# Patient Record
Sex: FEMALE | Race: WHITE
[De-identification: names, ages, dates, MRNs, and addresses within clinical notes are randomized per-mention and may not be internally consistent; named-entity substitution may affect disease eponyms.]

---

## 2017-12-29 ENCOUNTER — HOSPITAL ENCOUNTER (EMERGENCY)
Dept: HOSPITAL 8 - ED | Age: 27
Discharge: HOME | End: 2017-12-29
Payer: COMMERCIAL

## 2017-12-29 VITALS — SYSTOLIC BLOOD PRESSURE: 138 MMHG | DIASTOLIC BLOOD PRESSURE: 84 MMHG

## 2017-12-29 VITALS — BODY MASS INDEX: 21.02 KG/M2 | HEIGHT: 68 IN | WEIGHT: 138.67 LBS

## 2017-12-29 DIAGNOSIS — N83.202: Primary | ICD-10-CM

## 2017-12-29 LAB
ALBUMIN SERPL-MCNC: 4.5 G/DL (ref 3.4–5)
ALP SERPL-CCNC: 45 U/L (ref 45–117)
ALT SERPL-CCNC: 25 U/L (ref 12–78)
ANION GAP SERPL CALC-SCNC: 6 MMOL/L (ref 5–15)
BASOPHILS # BLD AUTO: 0.03 X10^3/UL (ref 0–0.1)
BASOPHILS NFR BLD AUTO: 1 % (ref 0–1)
BILIRUB SERPL-MCNC: 1.5 MG/DL (ref 0.2–1)
CALCIUM SERPL-MCNC: 9 MG/DL (ref 8.5–10.1)
CHLORIDE SERPL-SCNC: 103 MMOL/L (ref 98–107)
CREAT SERPL-MCNC: 0.78 MG/DL (ref 0.55–1.02)
CULTURE INDICATED?: YES
EOSINOPHIL # BLD AUTO: 0.06 X10^3/UL (ref 0–0.4)
EOSINOPHIL NFR BLD AUTO: 1 % (ref 1–7)
ERYTHROCYTE [DISTWIDTH] IN BLOOD BY AUTOMATED COUNT: 12.4 % (ref 9.6–15.2)
LYMPHOCYTES # BLD AUTO: 2.11 X10^3/UL (ref 1–3.4)
LYMPHOCYTES NFR BLD AUTO: 30 % (ref 22–44)
MCH RBC QN AUTO: 32.3 PG (ref 27–34.8)
MCHC RBC AUTO-ENTMCNC: 33.8 G/DL (ref 32.4–35.8)
MCV RBC AUTO: 95.5 FL (ref 80–100)
MD: NO
MICROSCOPIC: (no result)
MONOCYTES # BLD AUTO: 0.5 X10^3/UL (ref 0.2–0.8)
MONOCYTES NFR BLD AUTO: 7 % (ref 2–9)
NEUTROPHILS # BLD AUTO: 4.27 X10^3/UL (ref 1.8–6.8)
NEUTROPHILS NFR BLD AUTO: 61 % (ref 42–75)
PLATELET # BLD AUTO: 287 X10^3/UL (ref 130–400)
PMV BLD AUTO: 8.9 FL (ref 7.4–10.4)
PROT SERPL-MCNC: 8.8 G/DL (ref 6.4–8.2)
RBC # BLD AUTO: 5.21 X10^6/UL (ref 3.82–5.3)

## 2017-12-29 PROCEDURE — 84703 CHORIONIC GONADOTROPIN ASSAY: CPT

## 2017-12-29 PROCEDURE — 99285 EMERGENCY DEPT VISIT HI MDM: CPT

## 2017-12-29 PROCEDURE — 85025 COMPLETE CBC W/AUTO DIFF WBC: CPT

## 2017-12-29 PROCEDURE — 36415 COLL VENOUS BLD VENIPUNCTURE: CPT

## 2017-12-29 PROCEDURE — 87086 URINE CULTURE/COLONY COUNT: CPT

## 2017-12-29 PROCEDURE — 76830 TRANSVAGINAL US NON-OB: CPT

## 2017-12-29 PROCEDURE — 80053 COMPREHEN METABOLIC PANEL: CPT

## 2017-12-29 PROCEDURE — 81001 URINALYSIS AUTO W/SCOPE: CPT

## 2020-07-10 ENCOUNTER — HOSPITAL ENCOUNTER (OUTPATIENT)
Facility: MEDICAL CENTER | Age: 30
End: 2020-07-10
Attending: OBSTETRICS & GYNECOLOGY
Payer: OTHER GOVERNMENT

## 2020-07-10 PROCEDURE — 87591 N.GONORRHOEAE DNA AMP PROB: CPT

## 2020-07-10 PROCEDURE — 88175 CYTOPATH C/V AUTO FLUID REDO: CPT

## 2020-07-10 PROCEDURE — 87491 CHLMYD TRACH DNA AMP PROBE: CPT

## 2020-07-14 LAB
C TRACH DNA GENITAL QL NAA+PROBE: NEGATIVE
CYTOLOGY REG CYTOL: NORMAL
N GONORRHOEA DNA GENITAL QL NAA+PROBE: NEGATIVE
SPECIMEN SOURCE: NORMAL

## 2020-08-21 ENCOUNTER — HOSPITAL ENCOUNTER (OUTPATIENT)
Dept: LAB | Facility: MEDICAL CENTER | Age: 30
End: 2020-08-21
Attending: OBSTETRICS & GYNECOLOGY
Payer: OTHER GOVERNMENT

## 2020-08-21 LAB
ABO GROUP BLD: NORMAL
BASOPHILS # BLD AUTO: 0.3 % (ref 0–1.8)
BASOPHILS # BLD: 0.04 K/UL (ref 0–0.12)
BLD GP AB SCN SERPL QL: NORMAL
EOSINOPHIL # BLD AUTO: 0.06 K/UL (ref 0–0.51)
EOSINOPHIL NFR BLD: 0.5 % (ref 0–6.9)
ERYTHROCYTE [DISTWIDTH] IN BLOOD BY AUTOMATED COUNT: 40.6 FL (ref 35.9–50)
HBV SURFACE AG SER QL: NORMAL
HCT VFR BLD AUTO: 45 % (ref 37–47)
HCV AB SER QL: NORMAL
HGB BLD-MCNC: 15.5 G/DL (ref 12–16)
HIV 1+2 AB+HIV1 P24 AG SERPL QL IA: NORMAL
IMM GRANULOCYTES # BLD AUTO: 0.05 K/UL (ref 0–0.11)
IMM GRANULOCYTES NFR BLD AUTO: 0.4 % (ref 0–0.9)
LYMPHOCYTES # BLD AUTO: 1.99 K/UL (ref 1–4.8)
LYMPHOCYTES NFR BLD: 16.2 % (ref 22–41)
MCH RBC QN AUTO: 32.8 PG (ref 27–33)
MCHC RBC AUTO-ENTMCNC: 34.4 G/DL (ref 33.6–35)
MCV RBC AUTO: 95.1 FL (ref 81.4–97.8)
MONOCYTES # BLD AUTO: 0.71 K/UL (ref 0–0.85)
MONOCYTES NFR BLD AUTO: 5.8 % (ref 0–13.4)
NEUTROPHILS # BLD AUTO: 9.45 K/UL (ref 2–7.15)
NEUTROPHILS NFR BLD: 76.8 % (ref 44–72)
NRBC # BLD AUTO: 0 K/UL
NRBC BLD-RTO: 0 /100 WBC
PLATELET # BLD AUTO: 255 K/UL (ref 164–446)
PMV BLD AUTO: 10.8 FL (ref 9–12.9)
RBC # BLD AUTO: 4.73 M/UL (ref 4.2–5.4)
RH BLD: NORMAL
RUBV AB SER QL: 202 IU/ML
TREPONEMA PALLIDUM IGG+IGM AB [PRESENCE] IN SERUM OR PLASMA BY IMMUNOASSAY: NORMAL
WBC # BLD AUTO: 12.3 K/UL (ref 4.8–10.8)

## 2020-08-21 PROCEDURE — 36415 COLL VENOUS BLD VENIPUNCTURE: CPT

## 2020-08-21 PROCEDURE — 87340 HEPATITIS B SURFACE AG IA: CPT

## 2020-08-21 PROCEDURE — 86762 RUBELLA ANTIBODY: CPT

## 2020-08-21 PROCEDURE — 86780 TREPONEMA PALLIDUM: CPT

## 2020-08-21 PROCEDURE — 86850 RBC ANTIBODY SCREEN: CPT

## 2020-08-21 PROCEDURE — 86901 BLOOD TYPING SEROLOGIC RH(D): CPT

## 2020-08-21 PROCEDURE — 86900 BLOOD TYPING SEROLOGIC ABO: CPT

## 2020-08-21 PROCEDURE — 85025 COMPLETE CBC W/AUTO DIFF WBC: CPT

## 2020-08-21 PROCEDURE — 86803 HEPATITIS C AB TEST: CPT

## 2020-08-21 PROCEDURE — 87389 HIV-1 AG W/HIV-1&-2 AB AG IA: CPT

## 2020-09-18 ENCOUNTER — HOSPITAL ENCOUNTER (OUTPATIENT)
Dept: LAB | Facility: MEDICAL CENTER | Age: 30
End: 2020-09-18
Attending: OBSTETRICS & GYNECOLOGY
Payer: OTHER GOVERNMENT

## 2020-09-18 PROCEDURE — 82105 ALPHA-FETOPROTEIN SERUM: CPT

## 2020-09-18 PROCEDURE — 36415 COLL VENOUS BLD VENIPUNCTURE: CPT

## 2020-09-21 LAB
# FETUSES US: NORMAL
AFP MOM SERPL: 0.95
AFP SERPL-MCNC: 35 NG/ML
AGE - REPORTED: 31.6 YR
CURRENT SMOKER: NO
FAMILY MEMBER DISEASES HX: NO
GA METHOD: NORMAL
GA: NORMAL WK
IDDM PATIENT QL: NO
INTEGRATED SCN PATIENT-IMP: NORMAL
SPECIMEN DRAWN SERPL: NORMAL

## 2020-09-28 ENCOUNTER — HOSPITAL ENCOUNTER (OUTPATIENT)
Facility: MEDICAL CENTER | Age: 30
End: 2020-09-28
Attending: OBSTETRICS & GYNECOLOGY | Admitting: OBSTETRICS & GYNECOLOGY
Payer: OTHER GOVERNMENT

## 2020-09-28 VITALS
WEIGHT: 145 LBS | BODY MASS INDEX: 21.98 KG/M2 | HEART RATE: 79 BPM | SYSTOLIC BLOOD PRESSURE: 133 MMHG | HEIGHT: 68 IN | DIASTOLIC BLOOD PRESSURE: 70 MMHG | RESPIRATION RATE: 20 BRPM | OXYGEN SATURATION: 98 % | TEMPERATURE: 98.2 F

## 2020-09-29 NOTE — PROGRESS NOTES
"1900- EDC 2021, . Pt presented to labor unit for vaginal discharge. Denies UC's, LOF, VB. Abd soft and non tender touch. Doppler done, 's. 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 \" States she's not felt the baby move at this point. Dr. Goodwin at bedside. Performed SVE- closed/ thick/ long. US done at bedside as well,  confirmed cervical length and assessed well being of fetus. Orders received to discharge pt home undelivered.     - Discharge instructions given and explained, Pt verbalized understanding.     - Pt dicharged home undelivered.      "

## 2020-09-29 NOTE — CONSULTS
"DATE OF SERVICE:  2020    OB CONSULT    CHIEF COMPLAINT:  Concern about possible cervical dilatation.    HISTORY OF PRESENT ILLNESS:  The patient is a 30-year-old lady,  1 with   an ROBIN of 2021, making her EGA 18 weeks.  She reports uncomplicated   prenatal care until today when she passed cervical mucus at both 1300 and   1700.  There was no blood in the mucus.  She denies any history of   cervical pathology, and in particular has never had any surgical procedures on   her cervix.  She states that she \"checked her cervix\" herself and was concerned   that she might be dilated, so I asked her to come to labor and delivery.    PHYSICAL EXAMINATION:  Vitals are normal.  Fetal heart rate is normal.   Digital cervical exam is   nonworrisome, cervix is firm and tightly closed at the external os.     TRANSVAGINAL ULTRASOUND by me reveals normal cervical anatomy, with a cervical length of 42 mm and   no beaking present. Limited transabdominal  ultrasound reveals active fetus with normal amniotic fluid volume,   anterior placenta with no placenta previa.     I reassured the patient.  There are no activity restrictions.  She will go   home and keep her next appointment with Dr. Cosme.       ____________________________________     FLACO DIAZ MD    BEF / NTS    DD:  2020 19:40:46  DT:  2020 23:43:37    D#:  [id number]  Job#:  [id number]    cc: VIRAJ COSME MD    "

## 2020-11-12 ENCOUNTER — HOSPITAL ENCOUNTER (OUTPATIENT)
Dept: LAB | Facility: MEDICAL CENTER | Age: 30
End: 2020-11-12
Attending: OBSTETRICS & GYNECOLOGY
Payer: OTHER GOVERNMENT

## 2020-11-12 LAB
GLUCOSE 1H P 50 G GLC PO SERPL-MCNC: 86 MG/DL (ref 70–139)
HCT VFR BLD AUTO: 43.4 % (ref 37–47)
HGB BLD-MCNC: 14.3 G/DL (ref 12–16)
PLATELET # BLD AUTO: 205 K/UL (ref 164–446)
TREPONEMA PALLIDUM IGG+IGM AB [PRESENCE] IN SERUM OR PLASMA BY IMMUNOASSAY: NORMAL

## 2020-11-12 PROCEDURE — 86780 TREPONEMA PALLIDUM: CPT

## 2020-11-12 PROCEDURE — 85049 AUTOMATED PLATELET COUNT: CPT

## 2020-11-12 PROCEDURE — 82950 GLUCOSE TEST: CPT

## 2020-11-12 PROCEDURE — 85014 HEMATOCRIT: CPT

## 2020-11-12 PROCEDURE — 85018 HEMOGLOBIN: CPT

## 2020-11-12 PROCEDURE — 36415 COLL VENOUS BLD VENIPUNCTURE: CPT

## 2021-01-28 ENCOUNTER — HOSPITAL ENCOUNTER (OUTPATIENT)
Dept: LAB | Facility: MEDICAL CENTER | Age: 31
End: 2021-01-28
Attending: OBSTETRICS & GYNECOLOGY
Payer: OTHER GOVERNMENT

## 2021-01-28 PROCEDURE — 87150 DNA/RNA AMPLIFIED PROBE: CPT

## 2021-01-28 PROCEDURE — 87081 CULTURE SCREEN ONLY: CPT

## 2021-01-29 LAB — GP B STREP DNA SPEC QL NAA+PROBE: POSITIVE

## 2021-03-01 ENCOUNTER — HOSPITAL ENCOUNTER (INPATIENT)
Facility: MEDICAL CENTER | Age: 31
LOS: 2 days | End: 2021-03-03
Attending: OBSTETRICS & GYNECOLOGY | Admitting: OBSTETRICS & GYNECOLOGY
Payer: OTHER GOVERNMENT

## 2021-03-01 LAB
ERYTHROCYTE [DISTWIDTH] IN BLOOD BY AUTOMATED COUNT: 42 FL (ref 35.9–50)
HCT VFR BLD AUTO: 38.9 % (ref 37–47)
HGB BLD-MCNC: 12.7 G/DL (ref 12–16)
MCH RBC QN AUTO: 29.3 PG (ref 27–33)
MCHC RBC AUTO-ENTMCNC: 32.6 G/DL (ref 33.6–35)
MCV RBC AUTO: 89.8 FL (ref 81.4–97.8)
PLATELET # BLD AUTO: 155 K/UL (ref 164–446)
PMV BLD AUTO: 11.7 FL (ref 9–12.9)
RBC # BLD AUTO: 4.33 M/UL (ref 4.2–5.4)
SARS-COV+SARS-COV-2 AG RESP QL IA.RAPID: NOTDETECTED
SARS-COV-2 RNA RESP QL NAA+PROBE: NOTDETECTED
SPECIMEN SOURCE: NORMAL
SPECIMEN SOURCE: NORMAL
WBC # BLD AUTO: 17.3 K/UL (ref 4.8–10.8)

## 2021-03-01 PROCEDURE — 700101 HCHG RX REV CODE 250

## 2021-03-01 PROCEDURE — 36415 COLL VENOUS BLD VENIPUNCTURE: CPT

## 2021-03-01 PROCEDURE — 700111 HCHG RX REV CODE 636 W/ 250 OVERRIDE (IP)

## 2021-03-01 PROCEDURE — 85027 COMPLETE CBC AUTOMATED: CPT

## 2021-03-01 PROCEDURE — 700111 HCHG RX REV CODE 636 W/ 250 OVERRIDE (IP): Performed by: OBSTETRICS & GYNECOLOGY

## 2021-03-01 PROCEDURE — 304965 HCHG RECOVERY SERVICES

## 2021-03-01 PROCEDURE — U0005 INFEC AGEN DETEC AMPLI PROBE: HCPCS

## 2021-03-01 PROCEDURE — 700102 HCHG RX REV CODE 250 W/ 637 OVERRIDE(OP): Performed by: OBSTETRICS & GYNECOLOGY

## 2021-03-01 PROCEDURE — 59409 OBSTETRICAL CARE: CPT

## 2021-03-01 PROCEDURE — 0UBMXZZ EXCISION OF VULVA, EXTERNAL APPROACH: ICD-10-PCS | Performed by: OBSTETRICS & GYNECOLOGY

## 2021-03-01 PROCEDURE — 770002 HCHG ROOM/CARE - OB PRIVATE (112)

## 2021-03-01 PROCEDURE — A9270 NON-COVERED ITEM OR SERVICE: HCPCS | Performed by: OBSTETRICS & GYNECOLOGY

## 2021-03-01 PROCEDURE — 87426 SARSCOV CORONAVIRUS AG IA: CPT

## 2021-03-01 PROCEDURE — U0003 INFECTIOUS AGENT DETECTION BY NUCLEIC ACID (DNA OR RNA); SEVERE ACUTE RESPIRATORY SYNDROME CORONAVIRUS 2 (SARS-COV-2) (CORONAVIRUS DISEASE [COVID-19]), AMPLIFIED PROBE TECHNIQUE, MAKING USE OF HIGH THROUGHPUT TECHNOLOGIES AS DESCRIBED BY CMS-2020-01-R: HCPCS

## 2021-03-01 PROCEDURE — 700105 HCHG RX REV CODE 258: Performed by: OBSTETRICS & GYNECOLOGY

## 2021-03-01 RX ORDER — ONDANSETRON 4 MG/1
4 TABLET, ORALLY DISINTEGRATING ORAL EVERY 6 HOURS PRN
Status: DISCONTINUED | OUTPATIENT
Start: 2021-03-01 | End: 2021-03-03 | Stop reason: HOSPADM

## 2021-03-01 RX ORDER — METHYLERGONOVINE MALEATE 0.2 MG/ML
0.2 INJECTION INTRAVENOUS
Status: DISCONTINUED | OUTPATIENT
Start: 2021-03-01 | End: 2021-03-03 | Stop reason: HOSPADM

## 2021-03-01 RX ORDER — HYDROCODONE BITARTRATE AND ACETAMINOPHEN 5; 325 MG/1; MG/1
1 TABLET ORAL EVERY 4 HOURS PRN
Status: DISCONTINUED | OUTPATIENT
Start: 2021-03-01 | End: 2021-03-03 | Stop reason: HOSPADM

## 2021-03-01 RX ORDER — METHYLERGONOVINE MALEATE 0.2 MG/ML
0.2 INJECTION INTRAVENOUS
Status: DISCONTINUED | OUTPATIENT
Start: 2021-03-01 | End: 2021-03-01 | Stop reason: HOSPADM

## 2021-03-01 RX ORDER — MISOPROSTOL 200 UG/1
800 TABLET ORAL
Status: DISCONTINUED | OUTPATIENT
Start: 2021-03-01 | End: 2021-03-01 | Stop reason: HOSPADM

## 2021-03-01 RX ORDER — MISOPROSTOL 200 UG/1
800 TABLET ORAL
Status: DISCONTINUED | OUTPATIENT
Start: 2021-03-01 | End: 2021-03-03 | Stop reason: HOSPADM

## 2021-03-01 RX ORDER — SODIUM CHLORIDE, SODIUM LACTATE, POTASSIUM CHLORIDE, CALCIUM CHLORIDE 600; 310; 30; 20 MG/100ML; MG/100ML; MG/100ML; MG/100ML
INJECTION, SOLUTION INTRAVENOUS PRN
Status: DISCONTINUED | OUTPATIENT
Start: 2021-03-01 | End: 2021-03-03 | Stop reason: HOSPADM

## 2021-03-01 RX ORDER — HYDROCODONE BITARTRATE AND ACETAMINOPHEN 10; 325 MG/1; MG/1
1 TABLET ORAL EVERY 4 HOURS PRN
Status: DISCONTINUED | OUTPATIENT
Start: 2021-03-01 | End: 2021-03-03 | Stop reason: HOSPADM

## 2021-03-01 RX ORDER — LIDOCAINE HYDROCHLORIDE 10 MG/ML
INJECTION, SOLUTION INFILTRATION; PERINEURAL
Status: COMPLETED
Start: 2021-03-01 | End: 2021-03-01

## 2021-03-01 RX ORDER — ONDANSETRON 2 MG/ML
4 INJECTION INTRAMUSCULAR; INTRAVENOUS EVERY 6 HOURS PRN
Status: DISCONTINUED | OUTPATIENT
Start: 2021-03-01 | End: 2021-03-03 | Stop reason: HOSPADM

## 2021-03-01 RX ORDER — CITRIC ACID/SODIUM CITRATE 334-500MG
30 SOLUTION, ORAL ORAL EVERY 6 HOURS PRN
Status: DISCONTINUED | OUTPATIENT
Start: 2021-03-01 | End: 2021-03-01 | Stop reason: HOSPADM

## 2021-03-01 RX ORDER — SODIUM CHLORIDE, SODIUM LACTATE, POTASSIUM CHLORIDE, CALCIUM CHLORIDE 600; 310; 30; 20 MG/100ML; MG/100ML; MG/100ML; MG/100ML
INJECTION, SOLUTION INTRAVENOUS CONTINUOUS
Status: ACTIVE | OUTPATIENT
Start: 2021-03-01 | End: 2021-03-01

## 2021-03-01 RX ORDER — ACETAMINOPHEN 325 MG/1
325 TABLET ORAL EVERY 4 HOURS PRN
Status: DISCONTINUED | OUTPATIENT
Start: 2021-03-01 | End: 2021-03-03 | Stop reason: HOSPADM

## 2021-03-01 RX ORDER — IBUPROFEN 600 MG/1
600 TABLET ORAL EVERY 6 HOURS PRN
Status: DISCONTINUED | OUTPATIENT
Start: 2021-03-01 | End: 2021-03-03 | Stop reason: HOSPADM

## 2021-03-01 RX ORDER — VITAMIN A ACETATE, BETA CAROTENE, ASCORBIC ACID, CHOLECALCIFEROL, .ALPHA.-TOCOPHEROL ACETATE, DL-, THIAMINE MONONITRATE, RIBOFLAVIN, NIACINAMIDE, PYRIDOXINE HYDROCHLORIDE, FOLIC ACID, CYANOCOBALAMIN, CALCIUM CARBONATE, FERROUS FUMARATE, ZINC OXIDE, CUPRIC OXIDE 3080; 12; 120; 400; 1; 1.84; 3; 20; 22; 920; 25; 200; 27; 10; 2 [IU]/1; UG/1; MG/1; [IU]/1; MG/1; MG/1; MG/1; MG/1; MG/1; [IU]/1; MG/1; MG/1; MG/1; MG/1; MG/1
1 TABLET, FILM COATED ORAL
Status: DISCONTINUED | OUTPATIENT
Start: 2021-03-02 | End: 2021-03-03 | Stop reason: HOSPADM

## 2021-03-01 RX ORDER — LORATADINE 10 MG/1
10 TABLET ORAL DAILY
Status: ON HOLD | COMMUNITY
End: 2021-03-03

## 2021-03-01 RX ORDER — DOCUSATE SODIUM 100 MG/1
100 CAPSULE, LIQUID FILLED ORAL 2 TIMES DAILY PRN
Status: DISCONTINUED | OUTPATIENT
Start: 2021-03-01 | End: 2021-03-03 | Stop reason: HOSPADM

## 2021-03-01 RX ADMIN — SODIUM CHLORIDE, POTASSIUM CHLORIDE, SODIUM LACTATE AND CALCIUM CHLORIDE: 600; 310; 30; 20 INJECTION, SOLUTION INTRAVENOUS at 07:14

## 2021-03-01 RX ADMIN — AMPICILLIN SODIUM 2 G: 2 INJECTION, POWDER, FOR SOLUTION INTRAMUSCULAR; INTRAVENOUS at 07:14

## 2021-03-01 RX ADMIN — OXYTOCIN 20 UNITS: 10 INJECTION, SOLUTION INTRAMUSCULAR; INTRAVENOUS at 07:48

## 2021-03-01 RX ADMIN — LIDOCAINE HYDROCHLORIDE: 10 INJECTION, SOLUTION INFILTRATION; PERINEURAL at 07:55

## 2021-03-01 RX ADMIN — IBUPROFEN 600 MG: 600 TABLET ORAL at 16:28

## 2021-03-01 RX ADMIN — ACETAMINOPHEN 325 MG: 325 TABLET, FILM COATED ORAL at 09:06

## 2021-03-01 RX ADMIN — ACETAMINOPHEN 325 MG: 325 TABLET, FILM COATED ORAL at 16:25

## 2021-03-01 RX ADMIN — IBUPROFEN 600 MG: 600 TABLET ORAL at 09:06

## 2021-03-01 ASSESSMENT — PATIENT HEALTH QUESTIONNAIRE - PHQ9
1. LITTLE INTEREST OR PLEASURE IN DOING THINGS: NOT AT ALL
SUM OF ALL RESPONSES TO PHQ9 QUESTIONS 1 AND 2: 0
2. FEELING DOWN, DEPRESSED, IRRITABLE, OR HOPELESS: NOT AT ALL

## 2021-03-01 ASSESSMENT — COPD QUESTIONNAIRES
DO YOU EVER COUGH UP ANY MUCUS OR PHLEGM?: NO/ONLY WITH OCCASIONAL COLDS OR INFECTIONS
HAVE YOU SMOKED AT LEAST 100 CIGARETTES IN YOUR ENTIRE LIFE: NO/DON'T KNOW
COPD SCREENING SCORE: 0
DURING THE PAST 4 WEEKS HOW MUCH DID YOU FEEL SHORT OF BREATH: NONE/LITTLE OF THE TIME
IN THE PAST 12 MONTHS DO YOU DO LESS THAN YOU USED TO BECAUSE OF YOUR BREATHING PROBLEMS: DISAGREE/UNSURE

## 2021-03-01 ASSESSMENT — LIFESTYLE VARIABLES
EVER_SMOKED: NEVER
AVERAGE NUMBER OF DAYS PER WEEK YOU HAVE A DRINK CONTAINING ALCOHOL: 0
CONSUMPTION TOTAL: NEGATIVE
EVER FELT BAD OR GUILTY ABOUT YOUR DRINKING: NO
ON A TYPICAL DAY WHEN YOU DRINK ALCOHOL HOW MANY DRINKS DO YOU HAVE: 0
TOTAL SCORE: 0
TOTAL SCORE: 0
EVER HAD A DRINK FIRST THING IN THE MORNING TO STEADY YOUR NERVES TO GET RID OF A HANGOVER: NO
HOW MANY TIMES IN THE PAST YEAR HAVE YOU HAD 5 OR MORE DRINKS IN A DAY: 0
HAVE YOU EVER FELT YOU SHOULD CUT DOWN ON YOUR DRINKING: NO
TOTAL SCORE: 0
ALCOHOL_USE: NO
HAVE PEOPLE ANNOYED YOU BY CRITICIZING YOUR DRINKING: NO

## 2021-03-01 ASSESSMENT — PAIN DESCRIPTION - PAIN TYPE
TYPE: ACUTE PAIN
TYPE: ACUTE PAIN

## 2021-03-01 NOTE — PROGRESS NOTES
Report received from Isabel KRAUSE. 8-9 cm; intact. Transferred to [facility] via rney. Amp given for GBS positive. Dr. rice remain at bedside  0733: Pt wants to push; C/+2. MD remains at bedside for  of viable male at 0746. AROM with delivery of head to reduce nuchal x1. Baby and mother stable for skin to skin  1015: Bleeding light; fundus firm. Assisted to bathroom to void; able to void sufficient amount.

## 2021-03-01 NOTE — PROGRESS NOTES
Assumed pt care at 0715.  Received report from alexis RN.  Assessment completed.  Pt AAOx4.  Pt has no comlaints of pain at this time. Treaded socks in place, call light within reach and staff numbers provided.  Pt needs met at this time.

## 2021-03-01 NOTE — L&D DELIVERY NOTE
and elective excision of 3 vulvar skin tags (complete note dictated)    Baby: male, APGARs 8-8, not weighed yet  One loose NC  plac spont,  cc  Pt requested excision of 3 small vulvar skin tags, betadine prep, skin tags discarded and all 3 wounds approx with single sutures 3-0 monocryl    Only time for one dose ampicillin  SROM at delivery, clear AF

## 2021-03-01 NOTE — PROGRESS NOTES
0641-Pt presents to L&D c/o UC's every couple of minutes that have become more intense over the night. Denies LOF or VB and confirms +FM. TOCO and EFM applied. VS taken.  0643-SVE as charted  0648-Phoned Dr. Goodwin, updated on pt arrival/complaint/status, admit orders received  0650-Report given to PAUL Zamudio RN. POC discussed.

## 2021-03-01 NOTE — L&D DELIVERY NOTE
DATE OF SERVICE:  2021     PROCEDURES:  1.  Spontaneous vaginal delivery.  2.  Elective excision of three small vulvar skin tags.     OBSTETRICIAN:  Abhay Goodwin MD     DIAGNOSES:   1.  A 40-1/7 week gestation.  2.  Labor.  3.  Group B streptococcus positive.  4.  Nuchal cord  5.  Three vulvar skin tags     COMPLICATIONS:  None.     ESTIMATED BLOOD LOSS:  300 mL     FINDINGS:  Baby--- male.  One minute Apgar 8, five minute Apgar 8.  Weight:  3230 grams     SPECIMEN SENT TO PATHOLOGY:  None (skin tags discarded).     This 30-year-old lady is now .  She presented one day after her due date with   spontaneous labor and intact membranes.  She was 9 cm dilated on admission.    She received one dose of IV ampicillin because of a positive antepartum group   B strep culture.  There was not time for a second dose.  Second stage was   short.  Membranes ruptured during the delivery, revealing clear fluid.  Baby's   head came out occiput anterior in a controlled fashion with no episiotomy.  I   bulb suctioned the baby's mouth and nares.  I reduced a single loose nuchal   cord.  The shoulders and body delivered easily.  I placed the baby on her   chest and 5 minutes later, I doubly clamped and cut the cord.  The placenta   and all attached membranes delivered spontaneously in a timely fashion.  There   were no significant lacerations.  A hymenal remnant at the 7 o'clock position   split, but was not bleeding significantly and did not require sutures.     After the delivery, the patient requested that I excised three small vulvar   skin tags.  Two of them were on her right, one on her left, on the labia   majora.  I prepped the wounds with Betadine, excised the skin tags with   scissors, discarded the benign skin tags and closed each small wound with a   single suture of 3-0 Monocryl. Sponge and needle counts were correct.        ______________________________  MD ROMINA PARK/SUMEET    DD:   03/01/2021 08:13  DT:  03/01/2021 08:37    Job#:  [id number]    CC:VIRAJ COSME MD

## 2021-03-01 NOTE — H&P
ADMISSION HISTORY AND PHYSICAL EXAM, LABOR AND DELIVERY    30 year old G 1  P 0    ROBIN 2/28/2021   EGA 40 1/7 wks    CC/HPI: Contractions 14 hrs, much stronger for 2 hrs, denies fluid leakage, 9 cm dilated per RN.    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 HTN; F depression    PE:   Temp 97.9F  /78  HEENT WNL  Heart normal  Lungs CTA  Abdomen soft, NT, no HSM, fundal height appropriate  Extremities:  no edema,  DTR 1+, Tran neg bilaterally  Pelvic:  cervix 8-9 cm/100%/-1 per RN , cephalic presentation    LAB:    pending    MONITOR FINDINGS: FHR Cat II      Dx:    1. 40 1/7 wk gestation  2. GBS positive    PLAN:  Admit.  IV ampicillin GBS prophylaxis.  Pursue delivery.

## 2021-03-02 PROCEDURE — A9270 NON-COVERED ITEM OR SERVICE: HCPCS | Performed by: OBSTETRICS & GYNECOLOGY

## 2021-03-02 PROCEDURE — 770002 HCHG ROOM/CARE - OB PRIVATE (112)

## 2021-03-02 PROCEDURE — 700102 HCHG RX REV CODE 250 W/ 637 OVERRIDE(OP): Performed by: OBSTETRICS & GYNECOLOGY

## 2021-03-02 RX ADMIN — IBUPROFEN 600 MG: 600 TABLET ORAL at 02:29

## 2021-03-02 RX ADMIN — ACETAMINOPHEN 325 MG: 325 TABLET, FILM COATED ORAL at 02:29

## 2021-03-02 RX ADMIN — IBUPROFEN 600 MG: 600 TABLET ORAL at 17:27

## 2021-03-02 RX ADMIN — IBUPROFEN 600 MG: 600 TABLET ORAL at 21:29

## 2021-03-02 RX ADMIN — ACETAMINOPHEN 325 MG: 325 TABLET, FILM COATED ORAL at 17:28

## 2021-03-02 RX ADMIN — ACETAMINOPHEN 325 MG: 325 TABLET, FILM COATED ORAL at 23:34

## 2021-03-02 RX ADMIN — PRENATAL WITH FERROUS FUM AND FOLIC ACID 1 TABLET: 3080; 920; 120; 400; 22; 1.84; 3; 20; 10; 1; 12; 200; 27; 25; 2 TABLET ORAL at 08:21

## 2021-03-02 ASSESSMENT — PAIN DESCRIPTION - PAIN TYPE
TYPE: ACUTE PAIN
TYPE: ACUTE PAIN

## 2021-03-02 NOTE — CARE PLAN
Problem: Potential knowledge deficit related to lack of understanding of self and  care  Goal: Patient will verbalize understanding of self and infant care  Outcome: PROGRESSING AS EXPECTED     Problem: Potential anxiety related to difficulty adapting to parental role  Goal: Patient will verbalize and demonstrate effective bonding and parenting behavior  Outcome: PROGRESSING AS EXPECTED

## 2021-03-02 NOTE — LACTATION NOTE
This note was copied from a baby's chart.  Baby 40.1 weeks, , MOB Hx +breast changes, denies breastfeeding risk factors. RN called LC to room for latch assist. MOB has baby at breast, attempting to latch, baby swaddled in 2 blankets. LC un-swaddled baby and placed STS using starter position on right breast baby latched deep x 7 minutes then MOB moved baby to left breast & latched with positioning assistance- see latch assessment score.     Hand expression taught, MOB watched [employer] hand expression video & LC provided demo. Encouraged mother to hand express in addition to breastfeeding 4-5 times during day until milk supply comes in. Teaching on hunger cues, breastfeeding when baby shows cues no longer than 4 hours from last feed, breastfeed a minimum 8 times in 24 hours & cluster feeding is normal.     Breastfeeding Support & Zoom sheet provided.     Breastfeeding plan:  Breastfeed on cue a minimum 8x/24 hours no longer than 4 hours from last feed.

## 2021-03-02 NOTE — PROGRESS NOTES
PP day #1    S:  Pt doing well/  Minimal lochia. No problems voiding.  Working on breast feeding.  Will stay until tomorrow     O: T 97.8 P 63  /71  ABD: Soft, NT, FF below umb  EXT: no cords or Homans  H/H pending - was  on admission  GBS +  A+    A/P:  PP day #1 S/P , GBS +  Doing well  1.  F/U on H/H  2.  Continue routine pp care

## 2021-03-02 NOTE — PROGRESS NOTES
1900-report received from day rn. Pt in bed, awake at this time. Denies any needs for pain medication at this time. POC discussed. Verablizes understanding. Baby currently in nursery per mother request to allow rest. Next feeding due by 2130. Call light in reach. Bed locked and in low position. No needs at this time.

## 2021-03-03 VITALS
BODY MASS INDEX: 24.73 KG/M2 | HEART RATE: 69 BPM | SYSTOLIC BLOOD PRESSURE: 118 MMHG | RESPIRATION RATE: 18 BRPM | DIASTOLIC BLOOD PRESSURE: 70 MMHG | WEIGHT: 167 LBS | OXYGEN SATURATION: 98 % | TEMPERATURE: 98 F | HEIGHT: 69 IN

## 2021-03-03 PROCEDURE — 700102 HCHG RX REV CODE 250 W/ 637 OVERRIDE(OP): Performed by: OBSTETRICS & GYNECOLOGY

## 2021-03-03 PROCEDURE — A9270 NON-COVERED ITEM OR SERVICE: HCPCS | Performed by: OBSTETRICS & GYNECOLOGY

## 2021-03-03 RX ADMIN — IBUPROFEN 600 MG: 600 TABLET ORAL at 09:30

## 2021-03-03 RX ADMIN — ACETAMINOPHEN 325 MG: 325 TABLET, FILM COATED ORAL at 03:32

## 2021-03-03 RX ADMIN — PRENATAL WITH FERROUS FUM AND FOLIC ACID 1 TABLET: 3080; 920; 120; 400; 22; 1.84; 3; 20; 10; 1; 12; 200; 27; 25; 2 TABLET ORAL at 09:30

## 2021-03-03 RX ADMIN — IBUPROFEN 600 MG: 600 TABLET ORAL at 03:31

## 2021-03-03 RX ADMIN — ACETAMINOPHEN 325 MG: 325 TABLET, FILM COATED ORAL at 09:30

## 2021-03-03 ASSESSMENT — EDINBURGH POSTNATAL DEPRESSION SCALE (EPDS)
I HAVE BLAMED MYSELF UNNECESSARILY WHEN THINGS WENT WRONG: NO, NEVER
THINGS HAVE BEEN GETTING ON TOP OF ME: NO, I HAVE BEEN COPING AS WELL AS EVER
I HAVE BEEN ANXIOUS OR WORRIED FOR NO GOOD REASON: YES, VERY OFTEN
I HAVE BEEN SO UNHAPPY THAT I HAVE HAD DIFFICULTY SLEEPING: NOT AT ALL
I HAVE BEEN ABLE TO LAUGH AND SEE THE FUNNY SIDE OF THINGS: AS MUCH AS I ALWAYS COULD
I HAVE BEEN SO UNHAPPY THAT I HAVE BEEN CRYING: NO, NEVER
I HAVE FELT SCARED OR PANICKY FOR NO GOOD REASON: NO, NOT MUCH
I HAVE FELT SAD OR MISERABLE: NO, NOT AT ALL
I HAVE LOOKED FORWARD WITH ENJOYMENT TO THINGS: AS MUCH AS I EVER DID
THE THOUGHT OF HARMING MYSELF HAS OCCURRED TO ME: HARDLY EVER

## 2021-03-03 ASSESSMENT — PAIN DESCRIPTION - PAIN TYPE: TYPE: ACUTE PAIN

## 2021-03-03 NOTE — CARE PLAN
Patient has scant to light lochia with a firm palpable uterus.  Vital signs are within defined limits.  Assessment will continue.     Patient would like pain medication when it is available.  Pain assessment will continue.

## 2021-03-03 NOTE — PROGRESS NOTES
Mom and baby bonding well, pain controlled with motrin and tylenol.  Updated with plan of care, call light in reach and encourage to call for assistance.

## 2021-03-03 NOTE — DISCHARGE SUMMARY
Discharge Summary:      Helen Russo    Admit Date:   3/1/2021  Discharge Date:  3/3/2021     Admitting diagnosis:  Labor and delivery indication for care or intervention [O75.9]  Discharge Diagnosis: Status post vaginal, spontaneous.  Pregnancy Complications: GBBS carrier  Tubal Ligation:  no        History:  No past medical history on file.  OB History    Para Term  AB Living   1 1 1     1   SAB TAB Ectopic Molar Multiple Live Births           0 1      # Outcome Date GA Lbr Mejia/2nd Weight Sex Delivery Anes PTL Lv   1 Term 21 40w1d / 00:13 3.23 kg (7 lb 1.9 oz) M Vag-Spont Local N RACHEL        Patient has no known allergies.  There are no problems to display for this patient.       Hospital Course:   30 y.o. , now para 1, was admitted with the above mentioned diagnosis, pt admitted in Active Labor, she progressed to complete and had a vaginal, spontaneous delivery. Patient postpartum course was unremarkable, with progressive advancement in diet , ambulation and toleration of oral analgesia. Patient without complaints today and desires discharge.      Vitals:    21 1807 21 0600 21 1800 21 0547   BP: 119/72 111/71 121/82 118/70   Pulse: 72 63 68 69   Resp: 17 17 18 18   Temp:  36.6 °C (97.8 °F) 37.1 °C (98.8 °F) 36.7 °C (98 °F)   TempSrc: Temporal Temporal Temporal Temporal   SpO2: 97% 98% 96% 98%   Weight:       Height:           Current Facility-Administered Medications   Medication Dose   • ondansetron (ZOFRAN ODT) dispertab 4 mg  4 mg    Or   • ondansetron (ZOFRAN) syringe/vial injection 4 mg  4 mg   • oxytocin (PITOCIN) infusion (for postpartum)   mL/hr   • ibuprofen (MOTRIN) tablet 600 mg  600 mg   • acetaminophen (Tylenol) tablet 325 mg  325 mg   • HYDROcodone-acetaminophen (NORCO) 5-325 MG per tablet 1 tablet  1 tablet   • HYDROcodone/acetaminophen (NORCO)  MG per tablet 1 tablet  1 tablet   • LR infusion     • tetanus-dipth-acell  pertussis (Tdap) inj 0.5 mL  0.5 mL   • PRN oxytocin (PITOCIN) (20 Units/1000 mL) PRN for excessive uterine bleeding - See Admin Instr  125-999 mL/hr   • miSOPROStol (CYTOTEC) tablet 800 mcg  800 mcg   • methylergonovine (METHERGINE) injection 0.2 mg  0.2 mg   • docusate sodium (COLACE) capsule 100 mg  100 mg   • prenatal plus vitamin (STUARTNATAL 1+1) 27-1 MG tablet 1 tablet  1 tablet       Exam:  Gen: NAD  Breast Exam: negative  Abdomen: Abdomen soft, non-tender. BS normal. No masses,  No organomegaly  Fundus Non Tender: no  Extremity: extremities, peripheral pulses and reflexes normal     Labs:  Recent Labs     03/01/21  1859   WBC 17.3*   RBC 4.33   HEMOGLOBIN 12.7   HEMATOCRIT 38.9   MCV 89.8   MCH 29.3   MCHC 32.6*   RDW 42.0   PLATELETCT 155*   MPV 11.7        Activity:   Discharge to home  Pelvic Rest x 6 weeks    Assessment:  normal postpartum course  Discharge Assessment: No areas of skin breakdown/redness.     Follow up: 6 weeks with Dr Davies    Discharge Meds:   No current outpatient medications on file.     Ibuprofen/tylenol, OTC.     Janet Cardenas M.D.

## 2021-03-03 NOTE — LACTATION NOTE
This note was copied from a baby's chart.  Follow-up visit, weight loss 6%, couplet to be discharged today. MOB has baby latched independently, LC observed good positioning & deep latch, MOB denies pain with latch- see latch assessment score.     Breastfeeding teaching reviewed on breastfeed on cue, breastfeed a minimum 8 times in 24 hours no longer than 4 hours from last feed, STS & cluster feeding.    Breastfeeding plan:  Breastfeed on cue a minimum 8x/24 hours no longer than 4 hours from last feed.

## 2021-04-14 ENCOUNTER — HOSPITAL ENCOUNTER (OUTPATIENT)
Dept: LAB | Facility: MEDICAL CENTER | Age: 31
End: 2021-04-14
Attending: OBSTETRICS & GYNECOLOGY
Payer: OTHER GOVERNMENT

## 2021-04-14 LAB — CYTOLOGY REG CYTOL: NORMAL

## 2021-04-14 PROCEDURE — 88175 CYTOPATH C/V AUTO FLUID REDO: CPT

## 2021-11-24 ENCOUNTER — HOSPITAL ENCOUNTER (OUTPATIENT)
Facility: MEDICAL CENTER | Age: 31
End: 2021-11-24
Attending: OBSTETRICS & GYNECOLOGY
Payer: OTHER GOVERNMENT

## 2021-11-24 PROCEDURE — 87491 CHLMYD TRACH DNA AMP PROBE: CPT

## 2021-11-24 PROCEDURE — 87591 N.GONORRHOEAE DNA AMP PROB: CPT

## 2021-11-24 PROCEDURE — 88175 CYTOPATH C/V AUTO FLUID REDO: CPT

## 2021-12-03 ENCOUNTER — HOSPITAL ENCOUNTER (OUTPATIENT)
Dept: LAB | Facility: MEDICAL CENTER | Age: 31
End: 2021-12-03
Attending: OBSTETRICS & GYNECOLOGY

## 2021-12-03 ENCOUNTER — HOSPITAL ENCOUNTER (OUTPATIENT)
Facility: MEDICAL CENTER | Age: 31
End: 2021-12-03
Attending: OBSTETRICS & GYNECOLOGY
Payer: OTHER GOVERNMENT

## 2021-12-03 LAB
ABO GROUP BLD: NORMAL
BASOPHILS # BLD AUTO: 0.4 % (ref 0–1.8)
BASOPHILS # BLD: 0.04 K/UL (ref 0–0.12)
BLD GP AB SCN SERPL QL: NORMAL
EOSINOPHIL # BLD AUTO: 0.1 K/UL (ref 0–0.51)
EOSINOPHIL NFR BLD: 0.9 % (ref 0–6.9)
ERYTHROCYTE [DISTWIDTH] IN BLOOD BY AUTOMATED COUNT: 43 FL (ref 35.9–50)
HBV SURFACE AG SER QL: NORMAL
HCT VFR BLD AUTO: 47.3 % (ref 37–47)
HCV AB SER QL: NORMAL
HGB BLD-MCNC: 15.9 G/DL (ref 12–16)
HIV 1+2 AB+HIV1 P24 AG SERPL QL IA: NORMAL
IMM GRANULOCYTES # BLD AUTO: 0.04 K/UL (ref 0–0.11)
IMM GRANULOCYTES NFR BLD AUTO: 0.4 % (ref 0–0.9)
LYMPHOCYTES # BLD AUTO: 2.1 K/UL (ref 1–4.8)
LYMPHOCYTES NFR BLD: 19.6 % (ref 22–41)
MCH RBC QN AUTO: 31.5 PG (ref 27–33)
MCHC RBC AUTO-ENTMCNC: 33.6 G/DL (ref 33.6–35)
MCV RBC AUTO: 93.7 FL (ref 81.4–97.8)
MONOCYTES # BLD AUTO: 0.54 K/UL (ref 0–0.85)
MONOCYTES NFR BLD AUTO: 5 % (ref 0–13.4)
NEUTROPHILS # BLD AUTO: 7.89 K/UL (ref 2–7.15)
NEUTROPHILS NFR BLD: 73.7 % (ref 44–72)
NRBC # BLD AUTO: 0 K/UL
NRBC BLD-RTO: 0 /100 WBC
PLATELET # BLD AUTO: 229 K/UL (ref 164–446)
PMV BLD AUTO: 10.9 FL (ref 9–12.9)
RBC # BLD AUTO: 5.05 M/UL (ref 4.2–5.4)
RH BLD: NORMAL
RUBV AB SER QL: 246 IU/ML
TREPONEMA PALLIDUM IGG+IGM AB [PRESENCE] IN SERUM OR PLASMA BY IMMUNOASSAY: NORMAL
WBC # BLD AUTO: 10.7 K/UL (ref 4.8–10.8)

## 2021-12-03 PROCEDURE — 87340 HEPATITIS B SURFACE AG IA: CPT

## 2021-12-03 PROCEDURE — 86900 BLOOD TYPING SEROLOGIC ABO: CPT

## 2021-12-03 PROCEDURE — 36415 COLL VENOUS BLD VENIPUNCTURE: CPT

## 2021-12-03 PROCEDURE — 86850 RBC ANTIBODY SCREEN: CPT

## 2021-12-03 PROCEDURE — 86803 HEPATITIS C AB TEST: CPT

## 2021-12-03 PROCEDURE — 87086 URINE CULTURE/COLONY COUNT: CPT

## 2021-12-03 PROCEDURE — 87389 HIV-1 AG W/HIV-1&-2 AB AG IA: CPT

## 2021-12-03 PROCEDURE — 85025 COMPLETE CBC W/AUTO DIFF WBC: CPT

## 2021-12-03 PROCEDURE — 86901 BLOOD TYPING SEROLOGIC RH(D): CPT

## 2021-12-03 PROCEDURE — 86762 RUBELLA ANTIBODY: CPT

## 2021-12-03 PROCEDURE — 86780 TREPONEMA PALLIDUM: CPT

## 2021-12-05 LAB
BACTERIA UR CULT: NORMAL
SIGNIFICANT IND 70042: NORMAL
SITE SITE: NORMAL
SOURCE SOURCE: NORMAL

## 2022-01-07 ENCOUNTER — HOSPITAL ENCOUNTER (OUTPATIENT)
Dept: LAB | Facility: MEDICAL CENTER | Age: 32
End: 2022-01-07
Attending: OBSTETRICS & GYNECOLOGY
Payer: OTHER GOVERNMENT

## 2022-01-07 PROCEDURE — 82105 ALPHA-FETOPROTEIN SERUM: CPT

## 2022-01-07 PROCEDURE — 36415 COLL VENOUS BLD VENIPUNCTURE: CPT

## 2022-01-12 LAB
# FETUSES US: NORMAL
AFP MOM SERPL: 1.28
AFP SERPL-MCNC: 47 NG/ML
AGE - REPORTED: 32.9 YR
CURRENT SMOKER: NO
FAMILY MEMBER DISEASES HX: NO
GA METHOD: NORMAL
GA: NORMAL WK
IDDM PATIENT QL: NO
INTEGRATED SCN PATIENT-IMP: NORMAL
SPECIMEN DRAWN SERPL: NORMAL

## 2022-03-21 ENCOUNTER — HOSPITAL ENCOUNTER (OUTPATIENT)
Facility: MEDICAL CENTER | Age: 32
End: 2022-03-21
Attending: OBSTETRICS & GYNECOLOGY
Payer: OTHER GOVERNMENT

## 2022-03-21 LAB
GLUCOSE 1H P 50 G GLC PO SERPL-MCNC: 51 MG/DL (ref 70–139)
HCT VFR BLD AUTO: 41.4 % (ref 37–47)
HGB BLD-MCNC: 13.7 G/DL (ref 12–16)
PLATELET # BLD AUTO: 212 K/UL (ref 164–446)
T PALLIDUM AB SER QL IA: NORMAL

## 2022-03-21 PROCEDURE — 85014 HEMATOCRIT: CPT

## 2022-03-21 PROCEDURE — 82950 GLUCOSE TEST: CPT

## 2022-03-21 PROCEDURE — 36415 COLL VENOUS BLD VENIPUNCTURE: CPT

## 2022-03-21 PROCEDURE — 86780 TREPONEMA PALLIDUM: CPT

## 2022-03-21 PROCEDURE — 85018 HEMOGLOBIN: CPT

## 2022-03-21 PROCEDURE — 85049 AUTOMATED PLATELET COUNT: CPT

## 2022-05-20 ENCOUNTER — HOSPITAL ENCOUNTER (OUTPATIENT)
Facility: MEDICAL CENTER | Age: 32
End: 2022-05-20
Attending: OBSTETRICS & GYNECOLOGY
Payer: OTHER GOVERNMENT

## 2022-05-20 PROCEDURE — 87081 CULTURE SCREEN ONLY: CPT

## 2022-05-20 PROCEDURE — 87150 DNA/RNA AMPLIFIED PROBE: CPT

## 2022-05-21 LAB — GP B STREP DNA SPEC QL NAA+PROBE: POSITIVE

## 2022-06-15 ENCOUNTER — HOSPITAL ENCOUNTER (INPATIENT)
Facility: MEDICAL CENTER | Age: 32
LOS: 2 days | End: 2022-06-17
Attending: OBSTETRICS & GYNECOLOGY | Admitting: OBSTETRICS & GYNECOLOGY
Payer: OTHER GOVERNMENT

## 2022-06-15 LAB
BASOPHILS # BLD AUTO: 0.2 % (ref 0–1.8)
BASOPHILS # BLD: 0.03 K/UL (ref 0–0.12)
EOSINOPHIL # BLD AUTO: 0.06 K/UL (ref 0–0.51)
EOSINOPHIL NFR BLD: 0.4 % (ref 0–6.9)
ERYTHROCYTE [DISTWIDTH] IN BLOOD BY AUTOMATED COUNT: 43.8 FL (ref 35.9–50)
HCT VFR BLD AUTO: 41.6 % (ref 37–47)
HGB BLD-MCNC: 13.3 G/DL (ref 12–16)
HOLDING TUBE BB 8507: NORMAL
IMM GRANULOCYTES # BLD AUTO: 0.07 K/UL (ref 0–0.11)
IMM GRANULOCYTES NFR BLD AUTO: 0.5 % (ref 0–0.9)
LYMPHOCYTES # BLD AUTO: 1.29 K/UL (ref 1–4.8)
LYMPHOCYTES NFR BLD: 9.2 % (ref 22–41)
MCH RBC QN AUTO: 27 PG (ref 27–33)
MCHC RBC AUTO-ENTMCNC: 32 G/DL (ref 33.6–35)
MCV RBC AUTO: 84.4 FL (ref 81.4–97.8)
MONOCYTES # BLD AUTO: 0.65 K/UL (ref 0–0.85)
MONOCYTES NFR BLD AUTO: 4.6 % (ref 0–13.4)
NEUTROPHILS # BLD AUTO: 11.91 K/UL (ref 2–7.15)
NEUTROPHILS NFR BLD: 85.1 % (ref 44–72)
NRBC # BLD AUTO: 0 K/UL
NRBC BLD-RTO: 0 /100 WBC
PLATELET # BLD AUTO: 185 K/UL (ref 164–446)
PMV BLD AUTO: 11.6 FL (ref 9–12.9)
RBC # BLD AUTO: 4.93 M/UL (ref 4.2–5.4)
WBC # BLD AUTO: 14 K/UL (ref 4.8–10.8)

## 2022-06-15 PROCEDURE — 770002 HCHG ROOM/CARE - OB PRIVATE (112)

## 2022-06-15 PROCEDURE — 36415 COLL VENOUS BLD VENIPUNCTURE: CPT

## 2022-06-15 PROCEDURE — 85025 COMPLETE CBC W/AUTO DIFF WBC: CPT

## 2022-06-15 PROCEDURE — A9270 NON-COVERED ITEM OR SERVICE: HCPCS | Performed by: OBSTETRICS & GYNECOLOGY

## 2022-06-15 PROCEDURE — 700102 HCHG RX REV CODE 250 W/ 637 OVERRIDE(OP): Performed by: OBSTETRICS & GYNECOLOGY

## 2022-06-15 PROCEDURE — 700111 HCHG RX REV CODE 636 W/ 250 OVERRIDE (IP): Performed by: OBSTETRICS & GYNECOLOGY

## 2022-06-15 PROCEDURE — 304965 HCHG RECOVERY SERVICES

## 2022-06-15 PROCEDURE — 59409 OBSTETRICAL CARE: CPT

## 2022-06-15 RX ORDER — IBUPROFEN 800 MG/1
800 TABLET ORAL
Status: DISCONTINUED | OUTPATIENT
Start: 2022-06-15 | End: 2022-06-16 | Stop reason: HOSPADM

## 2022-06-15 RX ORDER — OXYTOCIN 10 [USP'U]/ML
10 INJECTION, SOLUTION INTRAMUSCULAR; INTRAVENOUS ONCE
Status: ACTIVE | OUTPATIENT
Start: 2022-06-15 | End: 2022-06-16

## 2022-06-15 RX ORDER — ACETAMINOPHEN 500 MG
1000 TABLET ORAL
Status: DISCONTINUED | OUTPATIENT
Start: 2022-06-15 | End: 2022-06-16 | Stop reason: HOSPADM

## 2022-06-15 RX ORDER — OXYTOCIN 10 [USP'U]/ML
INJECTION, SOLUTION INTRAMUSCULAR; INTRAVENOUS
Status: ACTIVE
Start: 2022-06-15 | End: 2022-06-16

## 2022-06-15 RX ORDER — TERBUTALINE SULFATE 1 MG/ML
0.25 INJECTION, SOLUTION SUBCUTANEOUS
Status: DISCONTINUED | OUTPATIENT
Start: 2022-06-15 | End: 2022-06-16 | Stop reason: HOSPADM

## 2022-06-15 RX ORDER — SODIUM CHLORIDE 9 MG/ML
INJECTION, SOLUTION INTRAVENOUS
Status: ACTIVE
Start: 2022-06-15 | End: 2022-06-16

## 2022-06-15 RX ORDER — MISOPROSTOL 200 UG/1
TABLET ORAL
Status: ACTIVE
Start: 2022-06-15 | End: 2022-06-16

## 2022-06-15 RX ORDER — SODIUM CHLORIDE, SODIUM LACTATE, POTASSIUM CHLORIDE, CALCIUM CHLORIDE 600; 310; 30; 20 MG/100ML; MG/100ML; MG/100ML; MG/100ML
INJECTION, SOLUTION INTRAVENOUS CONTINUOUS
Status: DISCONTINUED | OUTPATIENT
Start: 2022-06-15 | End: 2022-06-17 | Stop reason: HOSPADM

## 2022-06-15 RX ORDER — AMPICILLIN 2 G/1
INJECTION, POWDER, FOR SOLUTION INTRAVENOUS
Status: ACTIVE
Start: 2022-06-15 | End: 2022-06-16

## 2022-06-15 RX ORDER — OXYTOCIN 10 [USP'U]/ML
10 INJECTION, SOLUTION INTRAMUSCULAR; INTRAVENOUS
Status: COMPLETED | OUTPATIENT
Start: 2022-06-15 | End: 2022-06-15

## 2022-06-15 RX ORDER — MISOPROSTOL 200 UG/1
1000 TABLET ORAL ONCE
Status: COMPLETED | OUTPATIENT
Start: 2022-06-15 | End: 2022-06-15

## 2022-06-15 RX ADMIN — OXYTOCIN 10 UNITS: 10 INJECTION, SOLUTION INTRAMUSCULAR; INTRAVENOUS at 22:15

## 2022-06-15 RX ADMIN — MISOPROSTOL 1000 MCG: 200 TABLET ORAL at 22:15

## 2022-06-15 ASSESSMENT — LIFESTYLE VARIABLES
EVER HAD A DRINK FIRST THING IN THE MORNING TO STEADY YOUR NERVES TO GET RID OF A HANGOVER: NO
TOTAL SCORE: 0
CONSUMPTION TOTAL: INCOMPLETE
EVER FELT BAD OR GUILTY ABOUT YOUR DRINKING: NO
TOTAL SCORE: 0
HAVE PEOPLE ANNOYED YOU BY CRITICIZING YOUR DRINKING: NO
HAVE YOU EVER FELT YOU SHOULD CUT DOWN ON YOUR DRINKING: NO
ALCOHOL_USE: NO
TOTAL SCORE: 0

## 2022-06-15 ASSESSMENT — COPD QUESTIONNAIRES
DURING THE PAST 4 WEEKS HOW MUCH DID YOU FEEL SHORT OF BREATH: NONE/LITTLE OF THE TIME
IN THE PAST 12 MONTHS DO YOU DO LESS THAN YOU USED TO BECAUSE OF YOUR BREATHING PROBLEMS: DISAGREE/UNSURE
COPD SCREENING SCORE: 0
HAVE YOU SMOKED AT LEAST 100 CIGARETTES IN YOUR ENTIRE LIFE: NO/DON'T KNOW
DO YOU EVER COUGH UP ANY MUCUS OR PHLEGM?: NO/ONLY WITH OCCASIONAL COLDS OR INFECTIONS

## 2022-06-16 LAB
BASOPHILS # BLD AUTO: 0.2 % (ref 0–1.8)
BASOPHILS # BLD: 0.03 K/UL (ref 0–0.12)
EOSINOPHIL # BLD AUTO: 0.08 K/UL (ref 0–0.51)
EOSINOPHIL NFR BLD: 0.5 % (ref 0–6.9)
ERYTHROCYTE [DISTWIDTH] IN BLOOD BY AUTOMATED COUNT: 43.7 FL (ref 35.9–50)
HCT VFR BLD AUTO: 40.8 % (ref 37–47)
HGB BLD-MCNC: 13.1 G/DL (ref 12–16)
IMM GRANULOCYTES # BLD AUTO: 0.09 K/UL (ref 0–0.11)
IMM GRANULOCYTES NFR BLD AUTO: 0.6 % (ref 0–0.9)
LYMPHOCYTES # BLD AUTO: 1.97 K/UL (ref 1–4.8)
LYMPHOCYTES NFR BLD: 13.4 % (ref 22–41)
MCH RBC QN AUTO: 26.8 PG (ref 27–33)
MCHC RBC AUTO-ENTMCNC: 32.1 G/DL (ref 33.6–35)
MCV RBC AUTO: 83.4 FL (ref 81.4–97.8)
MONOCYTES # BLD AUTO: 0.83 K/UL (ref 0–0.85)
MONOCYTES NFR BLD AUTO: 5.7 % (ref 0–13.4)
NEUTROPHILS # BLD AUTO: 11.68 K/UL (ref 2–7.15)
NEUTROPHILS NFR BLD: 79.6 % (ref 44–72)
NRBC # BLD AUTO: 0 K/UL
NRBC BLD-RTO: 0 /100 WBC
PLATELET # BLD AUTO: 173 K/UL (ref 164–446)
PMV BLD AUTO: 11.1 FL (ref 9–12.9)
RBC # BLD AUTO: 4.89 M/UL (ref 4.2–5.4)
WBC # BLD AUTO: 14.7 K/UL (ref 4.8–10.8)

## 2022-06-16 PROCEDURE — 770002 HCHG ROOM/CARE - OB PRIVATE (112)

## 2022-06-16 PROCEDURE — 700102 HCHG RX REV CODE 250 W/ 637 OVERRIDE(OP): Performed by: OBSTETRICS & GYNECOLOGY

## 2022-06-16 PROCEDURE — 36415 COLL VENOUS BLD VENIPUNCTURE: CPT

## 2022-06-16 PROCEDURE — A9270 NON-COVERED ITEM OR SERVICE: HCPCS | Performed by: OBSTETRICS & GYNECOLOGY

## 2022-06-16 PROCEDURE — 85025 COMPLETE CBC W/AUTO DIFF WBC: CPT

## 2022-06-16 RX ORDER — ACETAMINOPHEN 500 MG
1000 TABLET ORAL EVERY 6 HOURS PRN
Status: DISCONTINUED | OUTPATIENT
Start: 2022-06-16 | End: 2022-06-17 | Stop reason: HOSPADM

## 2022-06-16 RX ORDER — DOCUSATE SODIUM 100 MG/1
100 CAPSULE, LIQUID FILLED ORAL 2 TIMES DAILY PRN
Status: DISCONTINUED | OUTPATIENT
Start: 2022-06-16 | End: 2022-06-17 | Stop reason: HOSPADM

## 2022-06-16 RX ORDER — SODIUM CHLORIDE, SODIUM LACTATE, POTASSIUM CHLORIDE, CALCIUM CHLORIDE 600; 310; 30; 20 MG/100ML; MG/100ML; MG/100ML; MG/100ML
INJECTION, SOLUTION INTRAVENOUS PRN
Status: DISCONTINUED | OUTPATIENT
Start: 2022-06-16 | End: 2022-06-17 | Stop reason: HOSPADM

## 2022-06-16 RX ORDER — IBUPROFEN 800 MG/1
800 TABLET ORAL EVERY 8 HOURS PRN
Status: DISCONTINUED | OUTPATIENT
Start: 2022-06-16 | End: 2022-06-17 | Stop reason: HOSPADM

## 2022-06-16 RX ORDER — OXYCODONE HYDROCHLORIDE 5 MG/1
5 TABLET ORAL EVERY 4 HOURS PRN
Status: DISCONTINUED | OUTPATIENT
Start: 2022-06-16 | End: 2022-06-17 | Stop reason: HOSPADM

## 2022-06-16 RX ADMIN — ACETAMINOPHEN 1000 MG: 500 TABLET, FILM COATED ORAL at 16:01

## 2022-06-16 RX ADMIN — ACETAMINOPHEN 1000 MG: 500 TABLET, FILM COATED ORAL at 06:19

## 2022-06-16 ASSESSMENT — EDINBURGH POSTNATAL DEPRESSION SCALE (EPDS)
I HAVE BEEN ANXIOUS OR WORRIED FOR NO GOOD REASON: YES, SOMETIMES
I HAVE LOOKED FORWARD WITH ENJOYMENT TO THINGS: AS MUCH AS I EVER DID
I HAVE BLAMED MYSELF UNNECESSARILY WHEN THINGS WENT WRONG: NO, NEVER
I HAVE FELT SCARED OR PANICKY FOR NO GOOD REASON: YES, SOMETIMES
I HAVE BEEN SO UNHAPPY THAT I HAVE BEEN CRYING: ONLY OCCASIONALLY
I HAVE FELT SAD OR MISERABLE: NO, NOT AT ALL
I HAVE BEEN SO UNHAPPY THAT I HAVE HAD DIFFICULTY SLEEPING: NOT AT ALL
I HAVE BEEN ABLE TO LAUGH AND SEE THE FUNNY SIDE OF THINGS: AS MUCH AS I ALWAYS COULD
THINGS HAVE BEEN GETTING ON TOP OF ME: NO, MOST OF THE TIME I HAVE COPED QUITE WELL
THE THOUGHT OF HARMING MYSELF HAS OCCURRED TO ME: NEVER

## 2022-06-16 NOTE — L&D DELIVERY NOTE
LABOR AND DELIVERY    DELIVERY SUMMARY    STAGE I LABOR:    The patient was admitted as a 30 yo  at 39w1d based upon her LMP of 2021 and consistent with a 10w1d u/s performed on 2021 who presents to labor and delivery in active labor.  At the time of admission, her cervix was noted to be completely dilated.  She was noted to be GBS positive.  She was ready to push and an attempt was made to start an IV and run IV ampicillin but she did not receive a dose of IV ampicillin in prior to delivery of the baby.  Occasional variable decelerations were noted.      STAGE II LABOR: 10 MINUTES    SROM clear fluid at 2145 with maternal expulsive efforts.    I was present for a  of a viable male infant 2148.  The vertex of the infant delivered without difficulty.  No nuchal cord was palpated and no shoulder dystocia was encountered.  The remainder of the infant quickly delivered without difficulty.  The infant was placed on the mother's abdomen.  The infant's mouth and nose were bulb suctioned and delayed cord clamping for 2 minutes was performed and then the cord was clamped and cut.      The APGAR scores were 7 at one minute and 9 at five minutes.     Infant weight pending.    STAGE III LABOR: 4 MINUTES    The placenta delivered intact with a 3-vessel cord at 2152.    The patient's perineum was examined and found to be intact.    Bimanual uterine massage was performed and small clots were removed from the lower uterine segment.    EBL - 300 cc

## 2022-06-16 NOTE — PROGRESS NOTES
"PPD #1 s/p     S:  Doing well.  Ambulating, voiding spontaneously, and tolerating a regular diet.  Pain is well controlled.  She has moderate lochia.  She is working on breast feeding.  She delivered before she could receive IV antibiotics and understands that she will not be discharged to home today.     O:  /69   Pulse 73   Temp 36.8 °C (98.3 °F) (Temporal)   Resp 17   Ht 1.753 m (5' 9\")   Wt 81.2 kg (179 lb)   SpO2 97%     A, A, and O x 3 NAD    FF u/1    No c/c/e    Recent Labs     06/15/22  2243 22  0619   WBC 14.0* 14.7*   RBC 4.93 4.89   HEMOGLOBIN 13.3 13.1   HEMATOCRIT 41.6 40.8   MCV 84.4 83.4   MCH 27.0 26.8*   RDW 43.8 43.7   PLATELETCT 185 173   MPV 11.6 11.1   NEUTSPOLYS 85.10* 79.60*   LYMPHOCYTES 9.20* 13.40*   MONOCYTES 4.60 5.70   EOSINOPHILS 0.40 0.50   BASOPHILS 0.20 0.20     A/P: PPD #1 s/p .  GBS positive - delivered prior to IV antibiotics.    1.  Ambulate TID.  2.  Continue cares.  3.  Work on breast feeding.  4.  Anticipate D/C tomorrow morning if baby ok to be discharged.  "

## 2022-06-16 NOTE — LACTATION NOTE
This note was copied from a baby's chart.  Baby 39.1 weeks, , MOB reports she struggled with breastfeeding first baby due to lip tie and damaged nipples. MOB states she has been hand expressing prenatally and brought in colostrum. Encouraged mother to continue to hand express & feed back until milk supply comes in. MOB states this baby might have a tongue tie, MOB declines lactation assistance.    Teaching on hunger cues, breastfeeding when baby cues, breastfeed a minimum 8 or more times in 24 hours no longer than 4 hours from last feed, STS while awake to promote breastfeeds.     Encouraged mother to call for any lactation needs.

## 2022-06-16 NOTE — H&P
"Admission H and P    HPI: The patient was admitted as a 30 yo  at 39w1d based upon her LMP of 2021 and consistent with a 10w1d u/s performed on 2021 who presents to labor and delivery in active labor.  She states that her contractions began at 1500.  She called the answering service at 1935 and was instructed to come to labor and delivery but did not arrive until approximately .  She was noted to be GBS positive but was unable to obtain a dose of IV antibiotics prior to delivery of the infant. See the delivery summary.    Prenatal care: Dr. Davies.    Prenatal labs:     21 09:30   ABO Grouping Only A   Rh Grouping Only POS   Antibody Screen Scrn NEG        Latest Reference Range & Units 21 09:30 22 09:15 22 10:57 22 10:43   Specimen   See Note     Glucose, Post Dose 70 - 139 mg/dL   51 (L)    Tobacco Use?   No     Hepatitis B Surface Antigen Non-Reactive  Non-Reactive      Hepatitis C Antibody Non-Reactive  Non-Reactive      HIV Ag/Ab Combo Assay Non Reactive  Non-Reactive      Rubella IgG Antibody IU/mL 246.00      Strep Gp B DNA PCR Negative     POSITIVE !   (L): Data is abnormally low  !: Data is abnormal    CFF DNA - low risk - male.  Low risk for Trisomy 21/18/13/Monosomy X/Triploidy.    MSAFP for OSB risk - negative.    GC/CT - negative/negative.    OB u/s:    1.  2021 - 10w1d.  ROBIN - 2022.  2.  2/15/2022 - w6d.  ROBIN - 2022.  [place].  Breech.  Male.  Posterior grade I placenta.  No previa.  Normal anatomy.      OB HX:    1.   - male - precipitous delivery.  3220 grams.  2.  Current.    PMHx: negative.  PSHx: negative  Social HX: no E/T/D/n use.  Gyn Hx: no HSV or abnormal paps.    PE: BP (!) 140/80   Pulse 80   Temp 36.6 °C (97.8 °F) (Temporal)   Resp 18   Ht 1.753 m (5' 9\")   Wt 81.2 kg (179 lb)   SpO2 97%     A, A, and O x 3 NAD    Gravid    EFW - 3200 grams    SVE: 10/100%/+1/BBOW    TOCO: every 3-5 minutes    EFM: category I " tracing.  Reactive and without decelerations.    A/P: 32 yo  at 39w1d based upon her LMP of 2021 and consistent with a 10w1d u/s performed on 2021 and who presents to labor and delivery in active labor and who is completely dilated and GBS positive and ready to begin maternal expulsive efforts prior to adequate IV access or IV ampicillin running.    1.  Admit to L and D.  2.  Anticipate .  3.  Begin maternal expulsive efforts.  4.  Obtain IV access.

## 2022-06-16 NOTE — CARE PLAN
Problem: Knowledge Deficit - Postpartum  Goal: Patient will verbalize and demonstrate understanding of self and infant care  Outcome: Progressing     Problem: Psychosocial - Postpartum  Goal: Patient will verbalize and demonstrate effective bonding and parenting behavior  Outcome: Progressing   The patient is Stable - Low risk of patient condition declining or worsening    Shift Goals  Clinical Goals: pain control; breastfeed  Patient Goals: rest  Family Goals: supportive care    Progress made toward(s) clinical / shift goals:  pain controlled; bonding with baby    Patient is not progressing towards the following goals:

## 2022-06-16 NOTE — DISCHARGE PLANNING
Discharge Planning Assessment Post Partum    Reason for Referral: hx of depression and anxiety   Address: [address] [place], [place] [address]keegan Zepeda  Phone:[phone]  Type of Living Situation:Stable and appropriate lives with FOB  Mom Diagnosis: Postpartum  Baby Diagnosis: [place]   Primary Language: English     Name of Baby: Robb Kirkland   Father of the Baby: Juan Jose Kirkland   Involved in baby’s care? Yes  Contact Information: [phone]    Prenatal Care: Yes  Mom's PCP: William Luna  PCP for new baby:[facility]    Support System: MOB stated good support   Coping/Bonding between mother & baby: MOB coping/bonding appropriately with baby during assessment   Source of Feeding: Breastfeeding   Supplies for Infant: MOB stated having all needed supplies     Mom's Insurance:    Baby Covered on Insurance:MOb will add baby   Mother Employed/School: Unknown   Other children in the home/names & ages: 15month old Santy    Financial Hardship/Income: None identified    Mom's Mental status: Stable and alert   Services used prior to admit: None    CPS History: None reported   Psychiatric History: Hx of depression and anxiety   Domestic Violence History: None   Drug/ETOH History: None reported     Resources Provided:  provided postpartum depression resources   Referrals Made: None     Clearance for Discharge: Baby is cleared to discharge with MOB and FOB when medically cleared

## 2022-06-16 NOTE — PROGRESS NOTES
2125 pt presented to L+D for c/o frequent UC. Pt called out in room stating she is feeling the urge to push. SVE C/C/+2 w/ intact membranes. Pt moved to S223, report given to Shelbie KRAUSE. Care relinquished.

## 2022-06-16 NOTE — CARE PLAN
The patient is Stable - Low risk of patient condition declining or worsening    Shift Goals  Clinical Goals: no bleeding, pain management, breastfeed  Patient Goals: bonding, breastfeed  Family Goals: supportive care    Progress made toward(s) clinical / shift goals:  Reports adequate pain management.  Breastfeeding independently.     Patient is not progressing towards the following goals:      Problem: Psychosocial - Postpartum  Goal: Patient will verbalize and demonstrate effective bonding and parenting behavior  Outcome: Progressing  Note: Bonding with infant in room appropriately.      Problem: Altered Physiologic Condition  Goal: Patient physiologically stable as evidenced by normal lochia, palpable uterine involution and vitals within normal limits  Outcome: Progressing  Note: Fundus firm, lochia light.

## 2022-06-16 NOTE — PROGRESS NOTES
Patient arrived to S312 with infant in arms accompanied by Shelbie KRAUSE, FOB and mom of patient.  Report received and bands verified with NELIDA Morfin.  Patient a&o x4, fundus firm, light-mod lochia.  Voided prior to arrival. Reports pain managed right now. Patient and  oriented to room, call light, emergency pull cord, infant safe sleep policy, infant feeding frequency, unit routines, pain management, labs, mother and infant care.  Reviewed visiting hours policy is 8am till 8pm.  Patient states understanding and agrees.  All questions answered at this time. Able to make needs known. White board updated. Call light within reach.    Feeding Plan: breastfeeding.

## 2022-06-17 VITALS
BODY MASS INDEX: 26.51 KG/M2 | WEIGHT: 179 LBS | TEMPERATURE: 98.2 F | RESPIRATION RATE: 18 BRPM | SYSTOLIC BLOOD PRESSURE: 113 MMHG | OXYGEN SATURATION: 97 % | HEIGHT: 69 IN | HEART RATE: 75 BPM | DIASTOLIC BLOOD PRESSURE: 68 MMHG

## 2022-06-17 PROCEDURE — A9270 NON-COVERED ITEM OR SERVICE: HCPCS | Performed by: OBSTETRICS & GYNECOLOGY

## 2022-06-17 PROCEDURE — 700102 HCHG RX REV CODE 250 W/ 637 OVERRIDE(OP): Performed by: OBSTETRICS & GYNECOLOGY

## 2022-06-17 RX ADMIN — IBUPROFEN 800 MG: 800 TABLET, FILM COATED ORAL at 07:28

## 2022-06-17 RX ADMIN — ACETAMINOPHEN 1000 MG: 500 TABLET, FILM COATED ORAL at 04:46

## 2022-06-17 RX ADMIN — ACETAMINOPHEN 1000 MG: 500 TABLET, FILM COATED ORAL at 11:29

## 2022-06-17 ASSESSMENT — PAIN DESCRIPTION - PAIN TYPE
TYPE: ACUTE PAIN
TYPE: ACUTE PAIN

## 2022-06-17 NOTE — CARE PLAN
The patient is Stable - Low risk of patient condition declining or worsening    Shift Goals  Clinical Goals: Pain managed, decreased cramping  Patient Goals: rest, home today  Family Goals: Bonding and go home    Progress made toward(s) clinical / shift goals:  Pain managed with ibuprofen and rest. Plan for discharge home today, independent in self and infant care    Patient is not progressing towards the following goals: all goals met.

## 2022-06-17 NOTE — LACTATION NOTE
This note was copied from a baby's chart.  Follow up LC visit: Mom reports baby is feeding well. She is noticing improvements with each feeding. Mom had a difficult history with first child. That baby was lip tied and mom had discomfort to her nipples and a low milk supply Mom states this baby is latching deeper, however she does notice a small lip tie that she doesn't feel is currently interfering with her feedings. Thus far she has had no discomfort.   Mom began hand expression into a neo[employer]ae[employer] collection devices prior to delivery and brought several  tubes of colostrum to the hospital from home. Mom was able to express approx 4-8 mls. Mom is dropping the colostrum into baby's mouth. LC encouraged mother to use a spoon or small cup for feeding vs. just dropping into baby's mouth so baby may his tongue more efficiently. Mom states she also has a Lisa medi-dose pacifier that hold about 5 mls and she will also give that to baby as needed. Briefly reviewed voids and stools .  Mom has her own lactation support in community and declined handout for community support services.. When asked if LC could assist with any information or concerns or help with a feed, mom she stated she was doing well and has no questions or concerns at this time.  Mom has a personal pump at home. 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  Mom preparing for discharge

## 2022-06-17 NOTE — PROGRESS NOTES
PP day #2    S:  Pt doing well.  Minimal lochia. Breast feeding. No problems voiding. OTC Motrin for pain. Has appointment at [facility] for 22. Has Zoloft at home and will start it if needed.    O: T 98.2 P 75  /68  ABD: Soft, NT, FF below umb  EXT: Trace pedal edema, no cords or Homans  H/H   GBS + A+    A/P:  PP day #2 S/P  at term, GBS +   Doing well  1.  D/C home  2. F/U Dr. Davies 6 weeks  3. Pelvic rest for 6 weeks  4. OTC Motrin and PNV

## 2022-06-17 NOTE — DISCHARGE SUMMARY
DATE OF ADMISSION:  06/15/2022   DATE OF DISCHARGE:  06/17/2022     ADMITTING DIAGNOSES:  1.  Intrauterine pregnancy at 39 and 1/7th weeks.  2.  Labor.  3.  Group B strep positive.     Please see previously dictated history and physical.     HOSPITAL COURSE:  The patient was admitted to labor and delivery on 06/15/2022   with the above diagnoses.  She subsequently underwent a precipitous normal   spontaneous vaginal delivery of a viable male infant, Apgars 7 and 9, weight   is 6 pounds 7 ounces.  The patient was noted to be group B strep positive, but   because of the precipitous nature of her delivery, she was unable to receive   any IV antibiotics prior to delivery.  The patient's postpartum course has   been unremarkable and on postpartum day 2, she was doing well.  She had   minimal lochia.  She was breastfeeding, had no problems voiding and was going   to use over-the-counter ibuprofen for pain.  The patient has been having some   issues with depression and has an appointment at [facility]   on 07/22/2022.  She does have Zoloft at home if needed.     PHYSICAL EXAMINATION ON DISCHARGE:  VITAL SIGNS:   Temperature was 98.2, pulse 75, blood pressure 113/68.  ABDOMEN:  Soft and nontender.  Fundus is firm below the umbilicus.  EXTREMITIES:  Show trace pedal edema.  She has no cords or Homans' sign.     LABORATORY DATA:  Her postpartum H and H were 13 and 40.  Group B strep status   is positive.  Blood type is A positive.     DISCHARGE DIAGNOSES:  1.  Intrauterine pregnancy at 39 and 1/7th weeks.  2.  Labor status post normal spontaneous vaginal delivery.  3.  Group B strep positive.  4.  Possible postpartum depression issues.     DISCHARGE INSTRUCTIONS:    1.  The patient will be discharged home with instructions to follow up with   Dr. Davies in 6 weeks.  2.  She is instructed on pelvic rest for 6 weeks.  3.  She will use over-the-counter ibuprofen and prenatal vitamins.  4.  She does have a  prescription for Zoloft as needed.        ______________________________  MD TAMANNA HERRING/CHIDI    DD:  06/17/2022 07:03  DT:  06/17/2022 08:50    Job#:  [id number]

## 2022-06-18 NOTE — PROGRESS NOTES
Discharged at approximately 1632 via walking with hospital escort. Discharge instructions given No further questions at this time.

## 2022-06-18 NOTE — DISCHARGE INSTRUCTIONS

## 2022-11-18 ENCOUNTER — HOSPITAL ENCOUNTER (OUTPATIENT)
Facility: MEDICAL CENTER | Age: 32
End: 2022-11-18
Attending: OBSTETRICS & GYNECOLOGY
Payer: OTHER GOVERNMENT

## 2022-11-18 ENCOUNTER — HOSPITAL ENCOUNTER (OUTPATIENT)
Dept: LAB | Facility: MEDICAL CENTER | Age: 32
End: 2022-11-18
Attending: OBSTETRICS & GYNECOLOGY

## 2022-11-18 PROCEDURE — 88175 CYTOPATH C/V AUTO FLUID REDO: CPT

## 2022-11-18 PROCEDURE — 87624 HPV HI-RISK TYP POOLED RSLT: CPT

## 2022-11-22 LAB
CYTOLOGY REG CYTOL: NORMAL
HPV HR 12 DNA CVX QL NAA+PROBE: NEGATIVE
HPV16 DNA SPEC QL NAA+PROBE: NEGATIVE
HPV18 DNA SPEC QL NAA+PROBE: NEGATIVE
SPECIMEN SOURCE: NORMAL

## 2023-01-17 ENCOUNTER — NON-PROVIDER VISIT (OUTPATIENT)
Dept: OCCUPATIONAL MEDICINE | Facility: CLINIC | Age: 33
End: 2023-01-17

## 2023-01-17 DIAGNOSIS — Z11.1 ENCOUNTER FOR PPD TEST: Primary | ICD-10-CM

## 2023-01-17 PROCEDURE — 86580 TB INTRADERMAL TEST: CPT | Performed by: NURSE PRACTITIONER

## 2023-01-19 ENCOUNTER — NON-PROVIDER VISIT (OUTPATIENT)
Dept: OCCUPATIONAL MEDICINE | Facility: CLINIC | Age: 33
End: 2023-01-19

## 2023-01-19 DIAGNOSIS — Z11.1 ENCOUNTER FOR PPD SKIN TEST READING: ICD-10-CM

## 2023-01-19 LAB — TB WHEAL 3D P 5 TU DIAM: NORMAL MM

## 2023-01-24 ENCOUNTER — NON-PROVIDER VISIT (OUTPATIENT)
Dept: OCCUPATIONAL MEDICINE | Facility: CLINIC | Age: 33
End: 2023-01-24

## 2023-01-24 DIAGNOSIS — Z11.1 ENCOUNTER FOR PPD TEST: Primary | ICD-10-CM

## 2023-01-24 PROCEDURE — 86580 TB INTRADERMAL TEST: CPT | Performed by: PREVENTIVE MEDICINE

## 2023-01-26 ENCOUNTER — NON-PROVIDER VISIT (OUTPATIENT)
Dept: OCCUPATIONAL MEDICINE | Facility: CLINIC | Age: 33
End: 2023-01-26

## 2023-01-26 DIAGNOSIS — Z11.1 ENCOUNTER FOR PPD SKIN TEST READING: ICD-10-CM

## 2023-01-26 LAB — TB WHEAL 3D P 5 TU DIAM: NORMAL MM

## 2023-01-26 PROCEDURE — 99026 IN-HOSPITAL ON CALL SERVICE: CPT | Performed by: NURSE PRACTITIONER

## 2023-04-29 ENCOUNTER — HOSPITAL ENCOUNTER (OUTPATIENT)
Dept: LAB | Facility: MEDICAL CENTER | Age: 33
End: 2023-04-29
Attending: NURSE PRACTITIONER
Payer: OTHER GOVERNMENT

## 2023-04-29 LAB
25(OH)D3 SERPL-MCNC: 24 NG/ML (ref 30–100)
ALBUMIN SERPL BCP-MCNC: 4.5 G/DL (ref 3.2–4.9)
ALBUMIN/GLOB SERPL: 1.5 G/DL
ALP SERPL-CCNC: 62 U/L (ref 30–99)
ALT SERPL-CCNC: 17 U/L (ref 2–50)
ANION GAP SERPL CALC-SCNC: 10 MMOL/L (ref 7–16)
AST SERPL-CCNC: 19 U/L (ref 12–45)
B-HCG SERPL-ACNC: <1 MIU/ML (ref 0–5)
BASOPHILS # BLD AUTO: 0.9 % (ref 0–1.8)
BASOPHILS # BLD: 0.05 K/UL (ref 0–0.12)
BILIRUB SERPL-MCNC: 0.4 MG/DL (ref 0.1–1.5)
BUN SERPL-MCNC: 18 MG/DL (ref 8–22)
CALCIUM ALBUM COR SERPL-MCNC: 8.9 MG/DL (ref 8.5–10.5)
CALCIUM SERPL-MCNC: 9.3 MG/DL (ref 8.5–10.5)
CHLORIDE SERPL-SCNC: 102 MMOL/L (ref 96–112)
CHOLEST SERPL-MCNC: 199 MG/DL (ref 100–199)
CO2 SERPL-SCNC: 26 MMOL/L (ref 20–33)
CREAT SERPL-MCNC: 0.73 MG/DL (ref 0.5–1.4)
EOSINOPHIL # BLD AUTO: 0.15 K/UL (ref 0–0.51)
EOSINOPHIL NFR BLD: 2.7 % (ref 0–6.9)
ERYTHROCYTE [DISTWIDTH] IN BLOOD BY AUTOMATED COUNT: 41.2 FL (ref 35.9–50)
FASTING STATUS PATIENT QL REPORTED: NORMAL
GFR SERPLBLD CREATININE-BSD FMLA CKD-EPI: 111 ML/MIN/1.73 M 2
GLOBULIN SER CALC-MCNC: 3 G/DL (ref 1.9–3.5)
GLUCOSE SERPL-MCNC: 84 MG/DL (ref 65–99)
HCT VFR BLD AUTO: 47.7 % (ref 37–47)
HDLC SERPL-MCNC: 76 MG/DL
HGB BLD-MCNC: 15.6 G/DL (ref 12–16)
IMM GRANULOCYTES # BLD AUTO: 0.02 K/UL (ref 0–0.11)
IMM GRANULOCYTES NFR BLD AUTO: 0.4 % (ref 0–0.9)
LDLC SERPL CALC-MCNC: 109 MG/DL
LYMPHOCYTES # BLD AUTO: 1.9 K/UL (ref 1–4.8)
LYMPHOCYTES NFR BLD: 34.3 % (ref 22–41)
MCH RBC QN AUTO: 30.6 PG (ref 27–33)
MCHC RBC AUTO-ENTMCNC: 32.7 G/DL (ref 33.6–35)
MCV RBC AUTO: 93.5 FL (ref 81.4–97.8)
MONOCYTES # BLD AUTO: 0.48 K/UL (ref 0–0.85)
MONOCYTES NFR BLD AUTO: 8.7 % (ref 0–13.4)
NEUTROPHILS # BLD AUTO: 2.94 K/UL (ref 2–7.15)
NEUTROPHILS NFR BLD: 53 % (ref 44–72)
NRBC # BLD AUTO: 0 K/UL
NRBC BLD-RTO: 0 /100 WBC
PLATELET # BLD AUTO: 249 K/UL (ref 164–446)
PMV BLD AUTO: 10.8 FL (ref 9–12.9)
POTASSIUM SERPL-SCNC: 4.8 MMOL/L (ref 3.6–5.5)
PROT SERPL-MCNC: 7.5 G/DL (ref 6–8.2)
RBC # BLD AUTO: 5.1 M/UL (ref 4.2–5.4)
SODIUM SERPL-SCNC: 138 MMOL/L (ref 135–145)
T3 SERPL-MCNC: 97.8 NG/DL (ref 60–181)
T4 FREE SERPL-MCNC: 1.31 NG/DL (ref 0.93–1.7)
TRIGL SERPL-MCNC: 72 MG/DL (ref 0–149)
TSH SERPL DL<=0.005 MIU/L-ACNC: 0.51 UIU/ML (ref 0.38–5.33)
WBC # BLD AUTO: 5.5 K/UL (ref 4.8–10.8)

## 2023-04-29 PROCEDURE — 84480 ASSAY TRIIODOTHYRONINE (T3): CPT

## 2023-04-29 PROCEDURE — 36415 COLL VENOUS BLD VENIPUNCTURE: CPT

## 2023-04-29 PROCEDURE — 85025 COMPLETE CBC W/AUTO DIFF WBC: CPT

## 2023-04-29 PROCEDURE — 80061 LIPID PANEL: CPT

## 2023-04-29 PROCEDURE — 84702 CHORIONIC GONADOTROPIN TEST: CPT

## 2023-04-29 PROCEDURE — 80053 COMPREHEN METABOLIC PANEL: CPT

## 2023-04-29 PROCEDURE — 84443 ASSAY THYROID STIM HORMONE: CPT

## 2023-04-29 PROCEDURE — 84439 ASSAY OF FREE THYROXINE: CPT

## 2023-04-29 PROCEDURE — 82306 VITAMIN D 25 HYDROXY: CPT

## 2023-10-09 NOTE — PROGRESS NOTES
Urogynecology and Pelvic Reconstructive Surgery Consultation Visit    Helen Russo MRN:[id number] :1990    Referred by: Abhay Goodwin MD    Reason for Visit:   Chief Complaint   Patient presents with    Gynecologic Exam     Rectocele          Subjective     History of Presenting Illness:    Helen Russo is a 33 y.o. year old P2 who was referred by Dr. Goodwin for the evaluation and management of prolapse.     He has bothersome vaginal bulge symptoms ever since her first vaginal delivery in .  She is most bothered by the feeling that she has to splint in order to have a bowel movement, and this happens on a daily basis.  These have progressed since her last delivery.  She did note that she had to splint with bowel movements even before her first delivery.  She has some fecal/little incontinence with standing, no overt fecal incontinence.    She has urinary leakage with jumping especially on the trampoline, which is bothersome to her.  She has no urge symptoms.    She is also been seen by Dr. Estes for her external hemorrhoids, and he is recommended hemorrhoidectomy for her, which could potentially performed in combination with her prolapse repair.    She is on Slynd for birth control, trying to start another formulation, although she is having insurance issues.    He has definitively decided that she has done with childbearing and did not desire any future pregnancies.    Prior Pelvic surgery:   none     Prior treatment:   None for prolapse or incontinence     Pelvic floor symptom review:     Bladder:   Voids per day: 6-7 Voids per night: 0      Urinary incontinence episodes per day: 1    Urge leakage:  Full Bladder   Stress leakage: With Cough, With Laugh, and With Exercise   Continuous / insensible urine loss: No    Nocturnal enuresis: No    Leakage volume: Moderate   Number of pads/day: 2-3    Bladder emptying: Complete   Voiding symptoms: Post-Void Dribble   UTI in last 12 months:  no   Other urologic history: no      Prolapse:     Prolapse symptoms: Bulge, Exteriorized Tissue, and Pelvic Pressure   Degree of prolapse: To Introitus   Duration of prolapse symptoms: years      Bowel:    Constipation: No    Bowel movements per day: 1    Straining to empty bowels: No    Splinting to evacuate: Yes   Painful evacuation: No    Difficulty emptying rectum: Yes   Incontinence to stool: staining with flatus   Blood in stool: No    Hemorrhoids: Yes   Bowel conditions: none   Most recent colonoscopy: n/a       Sexual function:    Sexually active: Yes   Pain with intercourse: No       Pelvic Pain: no      Past medical and surgical history    Past obstetric history   Number of vaginal deliveries: 2   Number of  deliveries: 0   History of vacuum/forceps: No    History of obstetric anal sphincter injury: No     Past gynecological history:    Last menstrual period: No LMP recorded (lmp unknown).   History of abnormal uterine bleeding: intermenstrual bleeeding whieon OCP   History of fibroids: No    History of endometrial polyps:  No    History of endometriosis: No    History of cervical dysplasia: No    Last pap: 2022 NILM/HPV neg   Current contraception: slynd, trying to change to to stellis      Past medical history:No past medical history on file.  Past surgical history:No past surgical history on file.  Medications:has a current medication list which includes the following prescription(s): sertraline and prenatal vit-fe fumarate-fa.  Allergies:Patient has no known allergies.  Family history:  Family History   Problem Relation Age of Onset    Cancer Mother         Breast/Thyroid    No Known Problems Father      Social history: reports that she has never smoked. She has never used smokeless tobacco. She reports current alcohol use. She reports current drug use. Drug: Marijuana.    Review of systems: A full review of systems was performed, and negative with the exception of want is noted above in  "the HPI.        Objective        /73 (BP Location: Right arm, Patient Position: Sitting, BP Cuff Size: Adult)   Ht 5' 9\"   Wt 160 lb   LMP  (LMP Unknown)   BMI 23.63 kg/m²     Physical Exam  Vitals reviewed. Exam conducted with a chaperone present (MA - see notes.).   Constitutional:       Appearance: Normal appearance.   HENT:      Head: Normocephalic.      Mouth/Throat:      Mouth: Mucous membranes are moist.   Cardiovascular:      Rate and Rhythm: Normal rate.   Pulmonary:      Effort: Pulmonary effort is normal.   Abdominal:      Palpations: Abdomen is soft. There is no mass.      Tenderness: There is no abdominal tenderness.   Skin:     General: Skin is warm and dry.   Neurological:      Mental Status: She is alert.   Psychiatric:         Mood and Affect: Mood normal.         Genitourinary:    External female genitalia: WNL   Vulva: WNL   Bulbocavernosus reflex: Intact   Anal wink reflex: Intact   Perineal sensation: WNL   Urethra: Hypermobile   Vagina: WNL   Atrophy: Mild   Cough stress test: Positive    Pelvic floor:    POP-Q: Aa 0 / Ba 0 / TVL 11 / C -4 / D -7 / Ap -1 / Bp -1 / GH 4.5 / PB 2   Large palpable rectocele/perineocele on rectovaginal examination with valsalva   Prolapse stage: 2   Paravaginal defect: bilateral    Cervical elongation: No    Urethral tenderness: No    Bladder/ suprapubic tenderness: No    Levator tenderness: None   Levator muscle tone: Hypertonic   Pelvic floor contraction strength (modified Oxford scale): 2=Weak   Pelvic floor contraction duration: Brief    Bimanual exam: Small, Mobile Uterus   Anal resting tone: WNL   Anal squeeze tone: WNL   Palpable anal sphincter defect: No    Granulation tissue: No    Epithelial erosions: No    Epithelial ulcerations: No    Fistula: None   Vaginal band/stricture: No     Procedure Performed: No    Diagnostic test and records review:     Post-void residual: 15 mL, performed by Bladder Scanner    Labs: n/a    Radiology: " n/a    Documentation reviewed: Prior EMR Records           Assessment & Plan     Helen Russo is a 33 y.o. year old P2 with symptomatic pelvic organ prolapse and just predominant urinary incontinence. We discussed my recommendations for further diagnosis and treatment at length today.     1. Incomplete uterovaginal prolapse  2. Cystocele, midline  3. Rectocele with perineocele  4. Outlet dysfunction constipation  She has symptomatic pelvic organ prolapse, anterior predominant on examination but a significant posterior component with a large occult rectocele and perineocele on rectovaginal examination.   I reviewed the clinical findings and discussed the pathogenesis extensively, including genetic tendency, connective tissue disorders including EDS, aging, and childbirth injuries. Also discussed options for management, including both nonsurgical and surgical options.   Nonsurgical options include both expectant management and pessary use. I discussed different types of pessary as well as pessary care. The patient can be fitted with a pessary device in the office by a physician and the pessary can either be removed regularly by the patient or left in place, returning to the office every 3 to 6 months for evaluation. I also discussed concomitant treatment with vaginal estrogen at least 2 times a week to help prevent vaginal erosions and infection.  She prefer definitive surgical management.  Surgical options include vaginal and abdominal reconstructive approaches, as well as procedures which preserve the uterus and involve hysterectomy.  While she is most bothered by her rectocele symptoms including outlet obstructive constipation, she has significant tricompartmental support problems on examination, and thus I recommend suspending all compartment safely moving forward with surgery.  Given her age and sexual function I think the 2 best options overall would be a native tissue approach via laparoscopic  hysteropexy and paravaginal repairs with posterior pair and perineorrhaphy, although since this does not use permanent implanted mesh and due to her younger age developing prolapse she may be at high risk of recurrence with this strategy.  I also counseled her on a mesh augmented laparoscopic sacrocolpopexy, which I advised would include a supracervical hysterectomy.  She initially was hesitant about a hysterectomy, but is most interested in the most durable treatment option.  I counseled her on the benefits of mesh which decrease the recurrence risk from 20% (native tissue) to less than 5%, although there is a 1 to 5% long-term chance of mesh exposure, although I use the safest mesh products, preserve the cervix, and use absorbable sutures on the vagina in order to mitigate this risk.  She understands that the cervix was retained, she has no history of abnormal Paps recently, and understands she will require further Paps in the future.  Since she does not desire future childbearing, this is also an appropriate option as it would treat her irregular bleeding on OCPs, and prevent further pregnancies.  She opts to move forward with surgical management via: Robotic assisted laparoscopic supracervical hysterectomy, bilateral salpingectomy, sacrocolpopexy, possible paravaginal repair, posterior repair/perineorrhaphy, possible mid urethral sling, and all indicated procedures.  Due to her having some defecatory dysfunction prior to her childbirth and rectocele, there may be an underlying neuromuscular component to her inability to emptying her rectum.  Occulta emptying the rectum is improved in 8% of women after a structural repair for prolapse, but often requires further treatment for neuromuscular function.  For these reasons I recommend pelvic floor physical therapy, referral [facility] to [facility], which is a practice that accepts .  In addition to verbal counseling detailed printed counseling provided with  instructions to review this before her preoperative visit      5. Stress incontinence of urine  She reports stress urinary incontinence (KHALIF) symptoms. The pathogenesis os KHALIF includes genetic tendency,  aging,  and childbirth injuries. I discussed options for management which include both nonsurgical and surgical options.   - Non-surgical pelvic floor physical therapy and pessary use.    - She has a significant degree of urethral hypermobility, positive cough stress test, complete emptying on examination today.  For these reason she does not require further work-up before moving forward with surgical options for stress incontinence.   - Counseled her also on all surgical options including mesh midurethral sling, fascial sling, urethral bulking, Wright urethropexy.   She is counseled the high success rates are seen with a retropubic mid urethral sling, but this does use a very similar permanent implanted mesh as her sacrocolpopexy.  Urethral bulking avoids mesh but has lower success rates and does not treat her underlying urethral hypermobility.  Fascial sling is an option with out mesh but increases recovery time and catheterization time, and wright urethropexy with her examination is unlikely to be very effective and has high recurrence rates.  - Mid urethral sling at time of prolapse repair    6. Hypoactive sexual desire  She has lower sexual desire, and is also on sertraline therapy, which she is on titrating.  I counseled on the link between these medications and hypoactive sexual desire, although this can also occur in postpartum periods, with age, and potentially body image which may be related to her prolapse.  I counseled that prolapse usually improve sexual function but is not designed to treat hypoactive sexual desire.  Some women have decreased desire on OCPs, and since she will not require this after hysterectomy this may also help her symptoms.  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  Counseled on possible CBT or sex therapy in the future if no improvement.               Marlen Frederick MD, FACOG  Urogynecology and Pelvic Reconstructive Surgery  Department of Obstetrics and Gynecology  [facility]    CC: Dr. Goodwin     This medical record contains text that has been entered with the assistance of computer voice recognition and dictation software.  Therefore, it may contain unintended errors in text, spelling, punctuation, or grammar

## 2023-10-12 ENCOUNTER — GYNECOLOGY VISIT (OUTPATIENT)
Dept: GYNECOLOGY | Facility: CLINIC | Age: 33
End: 2023-10-12
Payer: OTHER GOVERNMENT

## 2023-10-12 VITALS
SYSTOLIC BLOOD PRESSURE: 111 MMHG | HEIGHT: 69 IN | WEIGHT: 160 LBS | BODY MASS INDEX: 23.7 KG/M2 | DIASTOLIC BLOOD PRESSURE: 73 MMHG

## 2023-10-12 DIAGNOSIS — F52.0 HYPOACTIVE SEXUAL DESIRE: ICD-10-CM

## 2023-10-12 DIAGNOSIS — N81.11 CYSTOCELE, MIDLINE: ICD-10-CM

## 2023-10-12 DIAGNOSIS — N81.6 RECTOCELE: ICD-10-CM

## 2023-10-12 DIAGNOSIS — N39.3 STRESS INCONTINENCE OF URINE: ICD-10-CM

## 2023-10-12 DIAGNOSIS — N81.2 INCOMPLETE UTEROVAGINAL PROLAPSE: ICD-10-CM

## 2023-10-12 DIAGNOSIS — K59.02 OUTLET DYSFUNCTION CONSTIPATION: ICD-10-CM

## 2023-10-12 PROCEDURE — 3074F SYST BP LT 130 MM HG: CPT | Performed by: STUDENT IN AN ORGANIZED HEALTH CARE EDUCATION/TRAINING PROGRAM

## 2023-10-12 PROCEDURE — 3078F DIAST BP <80 MM HG: CPT | Performed by: STUDENT IN AN ORGANIZED HEALTH CARE EDUCATION/TRAINING PROGRAM

## 2023-10-12 PROCEDURE — 99204 OFFICE O/P NEW MOD 45 MIN: CPT | Performed by: STUDENT IN AN ORGANIZED HEALTH CARE EDUCATION/TRAINING PROGRAM

## 2023-10-12 RX ORDER — SERTRALINE HYDROCHLORIDE 100 MG/1
100 TABLET, FILM COATED ORAL DAILY
COMMUNITY

## 2023-10-12 ASSESSMENT — FIBROSIS 4 INDEX: FIB4 SCORE: 0.63

## 2023-10-12 NOTE — PROGRESS NOTES
Patient here for GYN visit/ Rectocele   Last seen on : NP   LMP : Irregular   BCM : Pill  Pap : 11/2022 -WNL   Pt states she is having issues with the current BC she is on.   Pt states she has a rectocele and cystocele.

## 2023-10-12 NOTE — PATIENT INSTRUCTIONS
Pre-op: What you should know before your surgery  Laparoscopic/robotic reconstructive surgery for prolapse  (mesh-augmented)      What you should know before your surgery  You are scheduled for surgery to fix your prolapse (vaginal bulge) using sutures and lightweight mesh to suspend pelvic structures back into a supported position. These surgeries are minimally-invasive, using only small “keyhole” cuts on the abdomen and in the vagina. The documents in this packet will outline each part of the surgery. There may be a few “possible” surgeries listed. We use the word “possible” because we tailor the surgery based on your needs. This means we won't know how much surgery you'll need until after you receive anesthesia and fall asleep.     When you fall asleep, the pelvic muscles will relax, allowing us to determine how much surgery you need. In general, we will do as few procedures as possible to fix your prolapse but address each area as needed.     Goals of prolapse surgery: The primary goal of surgery is to repair the prolapse and eliminate the symptoms of a vaginal bulge and pressure. Depending on your symptoms, the surgery may possibly improve bladder emptying and/or rectal emptying, as well as urinary incontinence.      Prolapse recurrence:  Your procedure will utilize a permanent implanted mesh material to enhance the support of your pelvic structures, and reduce the likelihood of your prolapse returning. This will be described in more detail below in the details of your surgery. However, even with this mesh, there is still a long-term risk of prolapse returning (30% of women), and needing an additional surgery (<10% of women).     Sexual function:  Following surgical repair, most patients experience improvements in their sexual function. Surgery tends to improve the general discomfort of sex associated with prolapse. However, if you have a long history of pain with sex (either externally or internally), this is  unlikely to be due to prolapse. Therefore, surgery may not improve these symptoms.     Surgery involves the cutting and re-sewing of the vaginal tissues. Scar tissue may form after surgery, which can lead to painful sex for some patients. In many patients, this new pain goes away over time or can be improved with pelvic physical therapy, lubrication, dilators, or vaginal estrogen.     Bladder urgency and incontinence after surgery:  Bladder tests may be performed before your surgery to see whether you are at risk for new or worsening urinary leakage after prolapse repair. Our bladder testing is a great tool to find out who is at risk for urinary leakage, but it is not perfect. There is a chance you may have new or increased leakage with coughing, sneezing, or laughing after surgery. Problems with leakage can be addressed in a number of ways. Bladder urgency and/or frequency often improves after surgery, but it may worsen in some patients. We have various medications and therapies that can help with this urgency after surgery, if necessary.    Risk of postoperative pain:   Pain after prolapse surgery is a normal part of the healing process, but usually well-tolerated. Common areas of pain include the pelvis, buttocks, hips and back, often related to positioning.   Try changing your position when sitting or resting in bed to relieve the pain    Expect to have some pain when you are discharged home. This should improve with time and with use of medications. See “after surgery” instructions for more details on pain control.      General risks of pelvic reconstructive surgery: Specific risks for your procedure are discussed separately  Anesthesia: With modern anesthetics and monitoring equipment, complications due to anesthesia are very rare. Your anesthesiologist will discuss what will be most suitable for you on the day of surgery  Bleeding: All surgery results in bleeding, however this surgery usually amounts to blood  loss between a tablespoon and a teacup. Large amounts of blood loss that requires a blood transfusion are not common (only 1-2% of women) in prolapse surgery.  Blood transfusion, if needed, is safe. Minor reactions like fever or allergy occur in less than 1% of transfusions. Riskan infection or mismatched blood is very rare (less than 1 out of every 100,000 transfusions).   Infection: The most common infection with prolapse surgery is a urinary tract infection (UTI). This is easily treated. Wound infections occur in 2-3% of patients. Rarely, infection of the abdominal/vaginal wounds may occur, or abscesses in the pelvis may develop. These infections may need to be treated with antibiotics or another procedure to fix them. Risk factors for infections and wound complications include diabetes, smoking, obesity, and other chronic illnesses.  Blood Clots: Clots in the blood vessels of the legs and lungs are potentially dangerous and can occur in patients undergoing prolapse surgery. This is prevented with leg compression during surgery, and sometimes, an injected blood thinner. We strongly encourage you to stay mobile because this is an important way to prevent clots.  Damage to surrounding organs. The pelvic organs are all very close together. The risk of damage to other organs increases if you have had prior pelvic surgeries, as well as a history of endometriosis or pelvic infection. These conditions can cause scar tissue to develop in the pelvis. Scar tissue can cause organs to stick together, making the surgery more difficult.    Ureter and bladder damage: The ureters are tubes that carry urine from the kidneys to the bladder. They travel very close to the uterus and vagina. The bladder is attached to both your uterus and the front of the vagina. Damage/injury can happen during prolapse-repair procedures. A cystoscopy (camera in the bladder) will be done during your surgery to ensure there is no bladder or ureter  damage/injury. If injury occurs, it may require temporary placement of a stent (drainage tube). In rare cases, a larger abdominal incision may be needed to repair the injury. A bladder catheter may need to stay in place temporarily after surgery.  Bowel damage: Bowel damage/injury is uncommon during your surgery. Any damage that is seen in surgery will be fixed. Very rarely, a temporary ostomy (connection of bowel to the front of the abdomen and collection of stool with a bag) is required for a higher-risk bowel injury.  Vascular damage: Major damage to blood vessels is uncommon. If this occurs, it may require a larger surgery and/or blood transfusion.  Fistula: A fistula is an abnormal connection between the vagina and either the bladder or rectum. A fistula leads to leakage of urine or stool. These are rare complications that arise during healing from pelvic surgery that sometimes require additional surgeries to correct. We take great care is during your surgery to prevent these fistulas. If they do occur, your Urogynecologist is the leading expert in fixing them.        Main Procedure:    Laparoscopic/robotic supra-cervical hysterectomy and sacrocervicopexy   (removal of uterus and suspension of cervix to backbone with mesh)       Once you are asleep, small “keyhole” incisions (smaller than ½ inch) will be made in the abdomen in order to place our instruments. Your surgeon may use a robotic system to help complete the surgery through the small incisions. The top of the uterus and your fallopian tubes will then be removed, leaving the lower portion of the uterus (cervix) in place. This is called a hysterectomy and salpingectomy. If you have decided to also remove the ovaries (oophorectomy), this will also be performed. Then, a lightweight permanent mesh will be sewn onto the cervix and the front and back surfaces of the vagina. This will lift up the prolapse and attach to a strong band of tissue on your sacrum  (lower back bone). We will then look into your bladder with a camera to make sure that no damage was done, and that your ureters (the tubes that carry urine from the kidneys to the bladder) are working. Sometimes we are unable to perform the mesh suspension safely, and may need to adjust to a different approach using other structures around the vagina.    The mesh we use is a lightweight permanent material. This material is stronger than your own tissues, which means there is less chance of the prolapse coming back again. Please note that the abdominal mesh for this procedure is not included in the recent controversies involving vaginal meshes.     Everybody's body responds to permanent material differently. 2-5% of women who undergo this surgery may note pain with sex, or notice the mesh exposed through the vagina in the future.  This is often easily treated with medication or a small procedure. In rarer instances the mesh can erode into surrounding structures, or lead to pain or recurrent infections. Sometimes the mesh may have to be removed, which would entail additional surgery.  Since we are not removing your cervix, you will need to continue with routine Pap screening for cervical cancer with your gynecologist.        After this procedure is complete, you will be re-examined and then proceed with the following possible procedures:    Main surgical procedure:  Posterior repair, perineorrhaphy  (fix prolapse of the back of the vagina/rectum and pelvic muscles)        The entire procedure will be completed in a minimally invasive manner, with all incisions inside of the vagina. Once you are asleep, a thorough exam will be performed to confirm the areas needing repair. A cut is made along the back wall of the vagina where the rectum is pushing down, and the skin above the rectum is  from the underlying supportive tissue. This stronger tissue underneath is then repaired using sutures that will dissolve  "over time. Sometimes excess skin is removed. The skin is then closed.     If the area between the vagina and the anus (called the perineum) is weak, then sutures will be placed to make that area stronger. This is called a \"perineorrhaphy.\" This will be just like a repair of an episiotomy or a vaginal tear after having a baby.     The risk of these procedures is similar to those mentioned in the “pre-op” leaflet. However, any surgery to the back wall of the vagina carries a higher risk of pain with sexual intercourse after surgery (up to 10%) due to scar formation. Great care is taken not to narrow the vagina more than necessary. The perineorrhaphy (or episiotomy type of repair) can be uncomfortable and may be difficult to sit normally for up to 6 weeks after surgery. You may use a doughnut cushion to sit on if needed.        Main Surgical Procedure:    Midurethral sling    (mesh sling under urethra to stop leakage)    The mid-urethral sling procedure will treat the problem of leakage of urine when you experience an increase in abdominal pressure, such as with coughing, sneezing, exercising, or laughing (stress incontinence). This procedure may also be performed if there is a high likelihood that you will develop new stress incontinence after a prolapse repair.    After you go to sleep, the lightweight mesh sling is placed through a small cut made in the vagina over the mid-point of the urethra. Through this cut, the two ends of the sling are passed with a special needle from the vagina, along either side of the urethra, exiting through two very small cuts made just above the pubic bone in the hairline (retropubic sling). Sometimes, the sling is passed through the groin area (trans-obturator sling). The surgeon will then use a camera (cystoscope) to check that the sling is correctly positioned and not sitting within the bladder. The sling is then adjusted so that it sits loosely underneath the urethra. The vaginal " skin is then stitched to cover the sling. The ends of the sling are cut off below the skin and the incision closed with skin glue or bandages.    This urinary incontinence mesh is not the same kind of vaginal mesh you may have heard about on television, which was removed from the market.  Your surgeon will speak to you in detail about this safer sling mesh at your visit.      Risks of this particular procedure include:  Difficulty emptying your bladder requiring a catheter is common immediately after surgery (20-50%). This usually resolves within 1 week. Most patients who can't urinate immediately after surgery will return to the office in 3-5 days to test the bladder again and remove the catheter. Longer-term difficulty passing urine may occur in 1-5% of patients. During this time your doctor may recommend a fine tube or catheter be used to drain the bladder. If your urine stream remains very slow or you cannot empty the bladder well even after the swelling has settled, your provider will discuss other possibilities, such as cutting or loosening the sling.  Sling exposure. Occasionally the sling can appear in the wall of the vagina a few weeks, months, or years after an operation. Symptoms may include vaginal bleeding, vaginal discharge, or pain with intercourse for the patient or her partner. In such cases, you should consult your surgeon for further advice. Management would involve either vaginal estrogen cream to help the skin grow over the sling, or cutting out the small part of the mesh coming through.  Rarely does the entire vaginal portion of the mesh need to be removed.  The arms of the mesh in the muscles cannot always be removed.  The risk of the mesh coming out into the vagina is 2-4%.  Bladder or urethral perforation. Bladder perforation occurs in 1-5% of procedures. Your surgeon will check for damage during the operation by looking inside the bladder and urethra using a cystoscope, a special camera  placed into the bladder. Removing and correctly locating the needle to which the sling is attached should resolve the situation. Bladder perforation, as long as it is recognized, does not affect the success of the operation. Damage to the urethra is more difficult to deal with and should be discussed with your surgeon. This is uncommon, and the sling may not be placed if this occurs.  Pain. Long-term pain following sling surgery is unusual. Studies suggest that after the operation about 1% will develop vaginal or groin pain. In most cases pain is short lived and does not occur for more than 1 to 2 weeks. In the rare instance that the pain does not go away with physical therapy or vaginal estrogen,  the sling may need to be removed.        Post-op: What to expect after your surgery      Recovery room  You can expect to stay in the recovery room for a few hours until you are alert. There may be a gauze packing in your vagina, which will be removed before you go home. Pain is normal after surgery but should be tolerable. Your pain will become less over the first 2 weeks. If your pain is difficult to control or you need more nursing care, you will stay in the hospital overnight. Most patients do very well going home on the day of surgery.     Bladder function  You will wake up with a small tube (catheter) in your bladder. A bladder test, called a “void trial”, will be performed by the nurse before you go home. The test is done to make sure you can empty your bladder normally. To do the bladder test, the nurse will fill your bladder with sterile water, remove the catheter, and give you 30 minutes to try and urinate (pee) on your own. If you cannot urinate, or if you only urinate a small amount, you will need to:   Go home with a catheter that stays in your bladder OR  Learn to put a catheter into your bladder a few times a day to empty it    Use of a catheter is temporary and usually needed for 2-4 days. It is very  common for the bladder to work slowly, or sluggishly, after this type of surgery. One in every 3-4 patients will require some type of catheterization. An antibiotic may be prescribed while the catheter is in place to decrease the risk of infection.    Call the surgeon for treatment, if you have signs and symptoms of a urinary tract infection, including:  Burning with urination  Bladder pain  Worsening need to urinate right away,  Urine with a bad smell    Vaginal care  Do not go swimming, take sitting baths, and have sexual intercourse for 6 weeks after surgery Do not place anything in your vagina except vaginal estrogen cream, if instructed to do so by your surgeon.   Vaginal discharge and bleeding/spotting is also normal through the entire 6-week recovery. Sometimes small sutures will fall out of the vagina as they dissolve. This is normal. Contact the office with any heavy bleeding, foul discharge or worsening pain.. Contact us with any heavy bleeding, foul discharge or worsening pain.     Pain management  Surgical pain is controlled in most patients with only acetaminophen (Tylenol) and non-steroidal anti-inflammatory drugs (NSAIDs), such as ibuprofen (Advil). These drugs can be taken together because they do not interact with each other. Check your prescription for specific dosing instructions. A cold compress can help with pain in the vaginal area.  Sometimes, a short course of narcotics, such as oxycodone or hydromorphone is required. We do not recommend using narcotics regularly as it can lead to constipation or dizziness and falls. Do not drive or operate heavy machinery while using narcotics. You are unlikely to become addicted if you need to take a narcotic medication a few times within the first week of your surgery.  After the first few days to one week, your pain should decrease and you should not have pain severe enough to need narcotics. If you continue having severe pain, contact your surgeon for  re-evaluation.     Abdominal wound care  The incisions on your abdomen will be closed with either small bandages or a surgical glue. There are also tiny dissolving sutures beneath the skin. You can shower with these in place. In shower, let the soapy warm water run over you incisions. Do not scrub or wipe you incisions. The bandages will fall off on their own, or you can remove them after at least 3 days if they become discolored or dirty. The glue will also fall off on its own after a few weeks. Small sutures that pop out through the skin are normal and will dissolve over time. Contact us if you feel increasing pain or warmth at the incision. Also, call if you see increased redness or discharge (pus) at the incision.      Bowel function  Constipation is common after surgery, and it sometimes take several days before having a normal bowel movement. It is important to use a bowel regimen to keep your stools soft and avoid straining with bowel movements, which may damage the prolapse repair before it has healed. Most patients will be given a prescription for a stool softener (docusate) as well as a gentle laxative (Miralax or lactulose), which adds water to the stool to make it easier to pass. Take these daily throughout your recovery, and hold for a day if you develop diarrhea. Please call us if you experience any repeated episodes of vomiting, worsening abdominal pain/bloating, or are unable to have a bowel movement for more than 3 days.     Activity restrictions  During the first 6 weeks you should avoid any type of heavy lifting.  Gentle walking is good exercise. Start with about 10 minutes a day when you feel ready and build up gradually. Avoid repetitive squatting or bending at the waist. Avoid any fitness-type training, aerobics, etc. for at least 6 weeks after surgery. Listen to your body during the recovery period, and increase activity when you feel comfortable. Generally, you will need 4-6 weeks off work.  This period may be longer if you have a very physical job.    Return to sex  When your surgeon clears you to resume sex (if desired), you should start out slowly and to use adequate lubrication. Your vagina and pelvis have been re-structured, and it can take time to get used to sex after surgery. Most scar tissue softens over time and discomfort improves. If discomfort does not go away, contact us to discuss to schedule an exam and to discuss further options. In some cases, your surgeon may prescribe a low dose of vaginal estrogen to help with vaginal dryness and pain that may happen with sex.

## 2023-11-29 ENCOUNTER — HOSPITAL ENCOUNTER (OUTPATIENT)
Facility: MEDICAL CENTER | Age: 33
End: 2023-11-29
Attending: OBSTETRICS & GYNECOLOGY
Payer: OTHER GOVERNMENT

## 2023-11-29 ENCOUNTER — HOSPITAL ENCOUNTER (OUTPATIENT)
Dept: LAB | Facility: MEDICAL CENTER | Age: 33
End: 2023-11-29
Attending: OBSTETRICS & GYNECOLOGY
Payer: OTHER GOVERNMENT

## 2023-11-29 PROCEDURE — 88175 CYTOPATH C/V AUTO FLUID REDO: CPT

## 2023-12-01 LAB
COMMENT NL11729A: NORMAL
CYTOLOGIST CVX/VAG CYTO: NORMAL
CYTOLOGY CVX/VAG DOC CYTO: NORMAL
CYTOLOGY CVX/VAG DOC THIN PREP: NORMAL
NOTE NL11727A: NORMAL
OTHER STN SPEC: NORMAL
STAT OF ADQ CVX/VAG CYTO-IMP: NORMAL

## 2023-12-18 ENCOUNTER — HOSPITAL ENCOUNTER (OUTPATIENT)
Dept: RADIOLOGY | Facility: MEDICAL CENTER | Age: 33
End: 2023-12-18
Attending: OBSTETRICS & GYNECOLOGY
Payer: OTHER GOVERNMENT

## 2023-12-18 DIAGNOSIS — Z12.31 ENCOUNTER FOR MAMMOGRAM TO ESTABLISH BASELINE MAMMOGRAM: ICD-10-CM

## 2023-12-18 DIAGNOSIS — Z80.3 FAMILY HISTORY OF MALIGNANT NEOPLASM OF BREAST: ICD-10-CM

## 2023-12-18 PROCEDURE — 77063 BREAST TOMOSYNTHESIS BI: CPT

## 2024-02-29 ENCOUNTER — HOSPITAL ENCOUNTER (OUTPATIENT)
Dept: LAB | Facility: MEDICAL CENTER | Age: 34
End: 2024-02-29
Attending: NURSE PRACTITIONER
Payer: OTHER GOVERNMENT

## 2024-02-29 LAB
25(OH)D3 SERPL-MCNC: 44 NG/ML (ref 30–100)
ALBUMIN SERPL BCP-MCNC: 4.3 G/DL (ref 3.2–4.9)
ALBUMIN/GLOB SERPL: 1.5 G/DL
ALP SERPL-CCNC: 42 U/L (ref 30–99)
ALT SERPL-CCNC: 11 U/L (ref 2–50)
ANION GAP SERPL CALC-SCNC: 10 MMOL/L (ref 7–16)
AST SERPL-CCNC: 14 U/L (ref 12–45)
BASOPHILS # BLD AUTO: 0.7 % (ref 0–1.8)
BASOPHILS # BLD: 0.04 K/UL (ref 0–0.12)
BILIRUB SERPL-MCNC: 0.7 MG/DL (ref 0.1–1.5)
BUN SERPL-MCNC: 11 MG/DL (ref 8–22)
CALCIUM ALBUM COR SERPL-MCNC: 8.9 MG/DL (ref 8.5–10.5)
CALCIUM SERPL-MCNC: 9.1 MG/DL (ref 8.5–10.5)
CHLORIDE SERPL-SCNC: 105 MMOL/L (ref 96–112)
CHOLEST SERPL-MCNC: 184 MG/DL (ref 100–199)
CO2 SERPL-SCNC: 25 MMOL/L (ref 20–33)
CREAT SERPL-MCNC: 0.54 MG/DL (ref 0.5–1.4)
EOSINOPHIL # BLD AUTO: 0.18 K/UL (ref 0–0.51)
EOSINOPHIL NFR BLD: 3.2 % (ref 0–6.9)
ERYTHROCYTE [DISTWIDTH] IN BLOOD BY AUTOMATED COUNT: 41.1 FL (ref 35.9–50)
FASTING STATUS PATIENT QL REPORTED: NORMAL
FERRITIN SERPL-MCNC: 65.6 NG/ML (ref 10–291)
GFR SERPLBLD CREATININE-BSD FMLA CKD-EPI: 123 ML/MIN/1.73 M 2
GLOBULIN SER CALC-MCNC: 2.9 G/DL (ref 1.9–3.5)
GLUCOSE SERPL-MCNC: 91 MG/DL (ref 65–99)
HCT VFR BLD AUTO: 46.4 % (ref 37–47)
HDLC SERPL-MCNC: 65 MG/DL
HGB BLD-MCNC: 15.8 G/DL (ref 12–16)
IMM GRANULOCYTES # BLD AUTO: 0.02 K/UL (ref 0–0.11)
IMM GRANULOCYTES NFR BLD AUTO: 0.4 % (ref 0–0.9)
IRON SATN MFR SERPL: 54 % (ref 15–55)
IRON SERPL-MCNC: 146 UG/DL (ref 40–170)
LDLC SERPL CALC-MCNC: 98 MG/DL
LYMPHOCYTES # BLD AUTO: 1.74 K/UL (ref 1–4.8)
LYMPHOCYTES NFR BLD: 31.2 % (ref 22–41)
MCH RBC QN AUTO: 31.3 PG (ref 27–33)
MCHC RBC AUTO-ENTMCNC: 34.1 G/DL (ref 32.2–35.5)
MCV RBC AUTO: 91.9 FL (ref 81.4–97.8)
MONOCYTES # BLD AUTO: 0.37 K/UL (ref 0–0.85)
MONOCYTES NFR BLD AUTO: 6.6 % (ref 0–13.4)
NEUTROPHILS # BLD AUTO: 3.23 K/UL (ref 1.82–7.42)
NEUTROPHILS NFR BLD: 57.9 % (ref 44–72)
NRBC # BLD AUTO: 0 K/UL
NRBC BLD-RTO: 0 /100 WBC (ref 0–0.2)
PLATELET # BLD AUTO: 284 K/UL (ref 164–446)
PMV BLD AUTO: 10.8 FL (ref 9–12.9)
POTASSIUM SERPL-SCNC: 4.2 MMOL/L (ref 3.6–5.5)
PROT SERPL-MCNC: 7.2 G/DL (ref 6–8.2)
RBC # BLD AUTO: 5.05 M/UL (ref 4.2–5.4)
SODIUM SERPL-SCNC: 140 MMOL/L (ref 135–145)
T4 FREE SERPL-MCNC: 1.47 NG/DL (ref 0.93–1.7)
TIBC SERPL-MCNC: 270 UG/DL (ref 250–450)
TRIGL SERPL-MCNC: 104 MG/DL (ref 0–149)
TSH SERPL DL<=0.005 MIU/L-ACNC: 0.42 UIU/ML (ref 0.38–5.33)
UIBC SERPL-MCNC: 124 UG/DL (ref 110–370)
VIT B12 SERPL-MCNC: 709 PG/ML (ref 211–911)
WBC # BLD AUTO: 5.6 K/UL (ref 4.8–10.8)

## 2024-02-29 PROCEDURE — 85025 COMPLETE CBC W/AUTO DIFF WBC: CPT

## 2024-02-29 PROCEDURE — 83550 IRON BINDING TEST: CPT

## 2024-02-29 PROCEDURE — 80061 LIPID PANEL: CPT

## 2024-02-29 PROCEDURE — 84439 ASSAY OF FREE THYROXINE: CPT

## 2024-02-29 PROCEDURE — 36415 COLL VENOUS BLD VENIPUNCTURE: CPT

## 2024-02-29 PROCEDURE — 84443 ASSAY THYROID STIM HORMONE: CPT

## 2024-02-29 PROCEDURE — 83540 ASSAY OF IRON: CPT

## 2024-02-29 PROCEDURE — 82728 ASSAY OF FERRITIN: CPT

## 2024-02-29 PROCEDURE — 82306 VITAMIN D 25 HYDROXY: CPT

## 2024-02-29 PROCEDURE — 80053 COMPREHEN METABOLIC PANEL: CPT

## 2024-02-29 PROCEDURE — 82607 VITAMIN B-12: CPT

## 2024-05-10 ENCOUNTER — APPOINTMENT (OUTPATIENT)
Dept: ADMISSIONS | Facility: MEDICAL CENTER | Age: 34
End: 2024-05-10
Attending: SURGERY
Payer: OTHER GOVERNMENT

## 2024-05-21 ENCOUNTER — PRE-ADMISSION TESTING (OUTPATIENT)
Dept: ADMISSIONS | Facility: MEDICAL CENTER | Age: 34
End: 2024-05-21
Attending: SURGERY
Payer: OTHER GOVERNMENT

## 2024-05-21 RX ORDER — MULTIVITAMIN WITH IRON
1 TABLET ORAL DAILY
COMMUNITY

## 2024-05-21 RX ORDER — DROSPIRENONE AND ESTETROL 3-14.2(28)
1 KIT ORAL DAILY
COMMUNITY
Start: 2024-03-16

## 2024-05-21 RX ORDER — B-COMPLEX WITH VITAMIN C
1 TABLET ORAL DAILY
COMMUNITY

## 2024-05-21 NOTE — OR NURSING
Pre-admit phone appt completed with patient. Meds reviewed. Instructions given per anesthesia protocol guideline. Copy of med rec emailed to patient at this time. Pt verbalizes understanding.

## 2024-06-04 ENCOUNTER — HOSPITAL ENCOUNTER (OUTPATIENT)
Facility: MEDICAL CENTER | Age: 34
End: 2024-06-04
Attending: SURGERY | Admitting: SURGERY
Payer: OTHER GOVERNMENT

## 2024-06-04 ENCOUNTER — ANESTHESIA (OUTPATIENT)
Dept: SURGERY | Facility: MEDICAL CENTER | Age: 34
End: 2024-06-04
Payer: OTHER GOVERNMENT

## 2024-06-04 ENCOUNTER — ANESTHESIA EVENT (OUTPATIENT)
Dept: SURGERY | Facility: MEDICAL CENTER | Age: 34
End: 2024-06-04
Payer: OTHER GOVERNMENT

## 2024-06-04 VITALS
RESPIRATION RATE: 16 BRPM | SYSTOLIC BLOOD PRESSURE: 124 MMHG | HEART RATE: 60 BPM | BODY MASS INDEX: 22.79 KG/M2 | OXYGEN SATURATION: 98 % | TEMPERATURE: 97 F | HEIGHT: 69 IN | WEIGHT: 153.88 LBS | DIASTOLIC BLOOD PRESSURE: 79 MMHG

## 2024-06-04 DIAGNOSIS — G89.18 POST-OP PAIN: ICD-10-CM

## 2024-06-04 LAB — HCG UR QL: NEGATIVE

## 2024-06-04 PROCEDURE — 160035 HCHG PACU - 1ST 60 MINS PHASE I: Performed by: SURGERY

## 2024-06-04 PROCEDURE — 81025 URINE PREGNANCY TEST: CPT

## 2024-06-04 PROCEDURE — 700105 HCHG RX REV CODE 258: Performed by: ANESTHESIOLOGY

## 2024-06-04 PROCEDURE — 160046 HCHG PACU - 1ST 60 MINS PHASE II: Performed by: SURGERY

## 2024-06-04 PROCEDURE — 160025 RECOVERY II MINUTES (STATS): Performed by: SURGERY

## 2024-06-04 PROCEDURE — 160009 HCHG ANES TIME/MIN: Performed by: SURGERY

## 2024-06-04 PROCEDURE — 160048 HCHG OR STATISTICAL LEVEL 1-5: Performed by: SURGERY

## 2024-06-04 PROCEDURE — 700101 HCHG RX REV CODE 250: Performed by: SURGERY

## 2024-06-04 PROCEDURE — 700111 HCHG RX REV CODE 636 W/ 250 OVERRIDE (IP): Performed by: ANESTHESIOLOGY

## 2024-06-04 PROCEDURE — 160002 HCHG RECOVERY MINUTES (STAT): Performed by: SURGERY

## 2024-06-04 PROCEDURE — 160039 HCHG SURGERY MINUTES - EA ADDL 1 MIN LEVEL 3: Performed by: SURGERY

## 2024-06-04 PROCEDURE — 700101 HCHG RX REV CODE 250: Performed by: ANESTHESIOLOGY

## 2024-06-04 PROCEDURE — 160028 HCHG SURGERY MINUTES - 1ST 30 MINS LEVEL 3: Performed by: SURGERY

## 2024-06-04 PROCEDURE — 88304 TISSUE EXAM BY PATHOLOGIST: CPT

## 2024-06-04 RX ORDER — ROCURONIUM BROMIDE 10 MG/ML
INJECTION, SOLUTION INTRAVENOUS PRN
Status: DISCONTINUED | OUTPATIENT
Start: 2024-06-04 | End: 2024-06-04 | Stop reason: SURG

## 2024-06-04 RX ORDER — CEFAZOLIN SODIUM 1 G/3ML
INJECTION, POWDER, FOR SOLUTION INTRAMUSCULAR; INTRAVENOUS PRN
Status: DISCONTINUED | OUTPATIENT
Start: 2024-06-04 | End: 2024-06-04 | Stop reason: SURG

## 2024-06-04 RX ORDER — HALOPERIDOL 5 MG/ML
1 INJECTION INTRAMUSCULAR
Status: DISCONTINUED | OUTPATIENT
Start: 2024-06-04 | End: 2024-06-04 | Stop reason: HOSPADM

## 2024-06-04 RX ORDER — LORATADINE 10 MG/1
10 TABLET ORAL EVERY EVENING
COMMUNITY

## 2024-06-04 RX ORDER — MEPERIDINE HYDROCHLORIDE 25 MG/ML
6.25 INJECTION INTRAMUSCULAR; INTRAVENOUS; SUBCUTANEOUS
Status: DISCONTINUED | OUTPATIENT
Start: 2024-06-04 | End: 2024-06-04 | Stop reason: HOSPADM

## 2024-06-04 RX ORDER — MEPERIDINE HYDROCHLORIDE 25 MG/ML
INJECTION INTRAMUSCULAR; INTRAVENOUS; SUBCUTANEOUS PRN
Status: DISCONTINUED | OUTPATIENT
Start: 2024-06-04 | End: 2024-06-04 | Stop reason: SURG

## 2024-06-04 RX ORDER — SODIUM CHLORIDE, SODIUM LACTATE, POTASSIUM CHLORIDE, CALCIUM CHLORIDE 600; 310; 30; 20 MG/100ML; MG/100ML; MG/100ML; MG/100ML
INJECTION, SOLUTION INTRAVENOUS
Status: DISCONTINUED | OUTPATIENT
Start: 2024-06-04 | End: 2024-06-04 | Stop reason: SURG

## 2024-06-04 RX ORDER — MIDAZOLAM HYDROCHLORIDE 1 MG/ML
INJECTION INTRAMUSCULAR; INTRAVENOUS PRN
Status: DISCONTINUED | OUTPATIENT
Start: 2024-06-04 | End: 2024-06-04 | Stop reason: SURG

## 2024-06-04 RX ORDER — DIPHENHYDRAMINE HYDROCHLORIDE 50 MG/ML
12.5 INJECTION INTRAMUSCULAR; INTRAVENOUS
Status: DISCONTINUED | OUTPATIENT
Start: 2024-06-04 | End: 2024-06-04 | Stop reason: HOSPADM

## 2024-06-04 RX ORDER — HYDROMORPHONE HYDROCHLORIDE 1 MG/ML
0.4 INJECTION, SOLUTION INTRAMUSCULAR; INTRAVENOUS; SUBCUTANEOUS
Status: DISCONTINUED | OUTPATIENT
Start: 2024-06-04 | End: 2024-06-04 | Stop reason: HOSPADM

## 2024-06-04 RX ORDER — BUPIVACAINE HYDROCHLORIDE AND EPINEPHRINE 5; 5 MG/ML; UG/ML
INJECTION, SOLUTION EPIDURAL; INTRACAUDAL; PERINEURAL
Status: DISCONTINUED | OUTPATIENT
Start: 2024-06-04 | End: 2024-06-04 | Stop reason: HOSPADM

## 2024-06-04 RX ORDER — HYDROMORPHONE HYDROCHLORIDE 1 MG/ML
0.1 INJECTION, SOLUTION INTRAMUSCULAR; INTRAVENOUS; SUBCUTANEOUS
Status: DISCONTINUED | OUTPATIENT
Start: 2024-06-04 | End: 2024-06-04 | Stop reason: HOSPADM

## 2024-06-04 RX ORDER — LIDOCAINE HYDROCHLORIDE 40 MG/ML
SOLUTION TOPICAL PRN
Status: DISCONTINUED | OUTPATIENT
Start: 2024-06-04 | End: 2024-06-04 | Stop reason: SURG

## 2024-06-04 RX ORDER — OXYCODONE HCL 5 MG/5 ML
5 SOLUTION, ORAL ORAL
Status: DISCONTINUED | OUTPATIENT
Start: 2024-06-04 | End: 2024-06-04 | Stop reason: HOSPADM

## 2024-06-04 RX ORDER — ONDANSETRON 2 MG/ML
INJECTION INTRAMUSCULAR; INTRAVENOUS PRN
Status: DISCONTINUED | OUTPATIENT
Start: 2024-06-04 | End: 2024-06-04 | Stop reason: SURG

## 2024-06-04 RX ORDER — HYDROMORPHONE HYDROCHLORIDE 1 MG/ML
0.2 INJECTION, SOLUTION INTRAMUSCULAR; INTRAVENOUS; SUBCUTANEOUS
Status: DISCONTINUED | OUTPATIENT
Start: 2024-06-04 | End: 2024-06-04 | Stop reason: HOSPADM

## 2024-06-04 RX ORDER — DEXAMETHASONE SODIUM PHOSPHATE 4 MG/ML
INJECTION, SOLUTION INTRA-ARTICULAR; INTRALESIONAL; INTRAMUSCULAR; INTRAVENOUS; SOFT TISSUE PRN
Status: DISCONTINUED | OUTPATIENT
Start: 2024-06-04 | End: 2024-06-04 | Stop reason: SURG

## 2024-06-04 RX ORDER — SODIUM CHLORIDE, SODIUM LACTATE, POTASSIUM CHLORIDE, CALCIUM CHLORIDE 600; 310; 30; 20 MG/100ML; MG/100ML; MG/100ML; MG/100ML
INJECTION, SOLUTION INTRAVENOUS CONTINUOUS
Status: DISCONTINUED | OUTPATIENT
Start: 2024-06-04 | End: 2024-06-04 | Stop reason: HOSPADM

## 2024-06-04 RX ORDER — OXYCODONE HCL 5 MG/5 ML
10 SOLUTION, ORAL ORAL
Status: DISCONTINUED | OUTPATIENT
Start: 2024-06-04 | End: 2024-06-04 | Stop reason: HOSPADM

## 2024-06-04 RX ORDER — OXYCODONE HYDROCHLORIDE AND ACETAMINOPHEN 5; 325 MG/1; MG/1
1-2 TABLET ORAL EVERY 4 HOURS PRN
Qty: 20 TABLET | Refills: 0 | Status: SHIPPED | OUTPATIENT
Start: 2024-06-04 | End: 2024-06-11

## 2024-06-04 RX ORDER — ONDANSETRON 2 MG/ML
4 INJECTION INTRAMUSCULAR; INTRAVENOUS
Status: DISCONTINUED | OUTPATIENT
Start: 2024-06-04 | End: 2024-06-04 | Stop reason: HOSPADM

## 2024-06-04 RX ADMIN — ONDANSETRON 4 MG: 2 INJECTION INTRAMUSCULAR; INTRAVENOUS at 15:42

## 2024-06-04 RX ADMIN — CEFAZOLIN 2 G: 1 INJECTION, POWDER, FOR SOLUTION INTRAMUSCULAR; INTRAVENOUS at 15:32

## 2024-06-04 RX ADMIN — DEXAMETHASONE SODIUM PHOSPHATE 4 MG: 4 INJECTION INTRA-ARTICULAR; INTRALESIONAL; INTRAMUSCULAR; INTRAVENOUS; SOFT TISSUE at 15:42

## 2024-06-04 RX ADMIN — FENTANYL CITRATE 100 MCG: 50 INJECTION, SOLUTION INTRAMUSCULAR; INTRAVENOUS at 15:28

## 2024-06-04 RX ADMIN — MEPERIDINE HYDROCHLORIDE 25 MG: 25 INJECTION INTRAMUSCULAR; INTRAVENOUS; SUBCUTANEOUS at 16:08

## 2024-06-04 RX ADMIN — SUGAMMADEX 200 MG: 100 INJECTION, SOLUTION INTRAVENOUS at 15:57

## 2024-06-04 RX ADMIN — ROCURONIUM BROMIDE 30 MG: 50 INJECTION, SOLUTION INTRAVENOUS at 15:29

## 2024-06-04 RX ADMIN — PROPOFOL 200 MG: 10 INJECTION, EMULSION INTRAVENOUS at 15:29

## 2024-06-04 RX ADMIN — LIDOCAINE HYDROCHLORIDE 4 ML: 40 SOLUTION TOPICAL at 15:30

## 2024-06-04 RX ADMIN — SODIUM CHLORIDE, POTASSIUM CHLORIDE, SODIUM LACTATE AND CALCIUM CHLORIDE: 600; 310; 30; 20 INJECTION, SOLUTION INTRAVENOUS at 15:25

## 2024-06-04 RX ADMIN — MIDAZOLAM HYDROCHLORIDE 2 MG: 1 INJECTION, SOLUTION INTRAMUSCULAR; INTRAVENOUS at 15:28

## 2024-06-04 ASSESSMENT — FIBROSIS 4 INDEX: FIB4 SCORE: 0.51

## 2024-06-04 ASSESSMENT — PAIN DESCRIPTION - PAIN TYPE
TYPE: ACUTE PAIN;CHRONIC PAIN
TYPE: SURGICAL PAIN

## 2024-06-04 NOTE — ANESTHESIA PROCEDURE NOTES
Airway    Date/Time: 6/4/2024 3:30 PM    Performed by: Anoop Colón M.D.  Authorized by: Anoop Colón M.D.    Location:  OR  Urgency:  Elective  Difficult Airway: No    Indications for Airway Management:  Anesthesia      Spontaneous Ventilation: absent    Sedation Level:  Deep  Preoxygenated: Yes    Patient Position:  Sniffing  Mask Difficulty Assessment:  0 - not attempted  Final Airway Type:  Endotracheal airway  Final Endotracheal Airway:  ETT  Cuffed: Yes    Technique Used for Successful ETT Placement:  Direct laryngoscopy    Insertion Site:  Oral  Blade Type:  Edson  Laryngoscope Blade/Videolaryngoscope Blade Size:  3  ETT Size (mm):  6.5  Measured from:  Teeth  ETT to Teeth (cm):  22  Placement Verified by: auscultation and capnometry    Cormack-Lehane Classification:  Grade I - full view of glottis  Number of Attempts at Approach:  1

## 2024-06-04 NOTE — DISCHARGE INSTRUCTIONS
HOME CARE INSTRUCTIONS    ACTIVITY: Rest and take it easy for the first 24 hours.  A responsible adult is recommended to remain with you during that time.  It is normal to feel sleepy.  We encourage you to not do anything that requires balance, judgment or coordination.    FOR 24 HOURS DO NOT:  Drive, operate machinery or run household appliances.  Drink beer or alcoholic beverages.  Make important decisions or sign legal documents.    SPECIAL INSTRUCTIONS:   Diet as tolerated  May shower daily with wounds uncovered    Incision and Drainage, Care After  This sheet gives you information about how to care for yourself after your procedure. Your health care provider may also give you more specific instructions. If you have problems or questions, contact your health care provider.  What can I expect after the procedure?  After the procedure, it is common to have:  Pain or discomfort around the incision site.  Blood, fluid, or pus (drainage) from the incision.  Redness and firm skin around the incision site.  Follow these instructions at home:  Medicines  Take over-the-counter and prescription medicines only as told by your health care provider.  If you were prescribed an antibiotic medicine, use or take it as told by your health care provider. Do not stop using the antibiotic even if you start to feel better.  Wound care  Follow instructions from your health care provider about how to take care of your wound. Make sure you:  Wash your hands with soap and water before and after you change your bandage (dressing). If soap and water are not available, use hand .  Change your dressing and packing as told by your health care provider.  If your dressing is dry or stuck when you try to remove it, moisten or wet the dressing with saline or water so that it can be removed without harming your skin or tissues.  If your wound is packed, leave it in place until your health care provider tells you to remove it. To remove  the packing, moisten or wet the packing with saline or water so that it can be removed without harming your skin or tissues.  Leave stitches (sutures), skin glue, or adhesive strips in place. These skin closures may need to stay in place for 2 weeks or longer. If adhesive strip edges start to loosen and curl up, you may trim the loose edges. Do not remove adhesive strips completely unless your health care provider tells you to do that.  Check your wound every day for signs of infection. Check for:  More redness, swelling, or pain.  More fluid or blood.  Warmth.  Pus or a bad smell.  If you were sent home with a drain tube in place, follow instructions from your health care provider about:  How to empty it.  How to care for it at home.    General instructions  Rest the affected area.  Do not take baths, swim, or use a hot tub until your health care provider approves. Ask your health care provider if you may take showers. You may only be allowed to take sponge baths.  Return to your normal activities as told by your health care provider. Ask your health care provider what activities are safe for you. Your health care provider may put you on activity or lifting restrictions.  The incision will continue to drain. It is normal to have some clear or slightly bloody drainage. The amount of drainage should lessen each day.  Do not apply any creams, ointments, or liquids unless you have been told to by your health care provider.  Keep all follow-up visits as told by your health care provider. This is important.  Contact a health care provider if:  Your cyst or abscess returns.  You have more redness, swelling, or pain around your incision.  You have more fluid or blood coming from your incision.  Your incision feels warm to the touch.  You have pus or a bad smell coming from your incision.  You have red streaks above or below the incision site.  Get help right away if:  You have severe pain or bleeding.  You cannot eat or  drink without vomiting.  You have a fever or chills.  You have redness that spreads quickly.  You have decreased urine output.  You become short of breath.  You have chest pain.  You cough up blood.  The affected area becomes numb or starts to tingle.  These symptoms may represent a serious problem that is an emergency. Do not wait to see if the symptoms will go away. Get medical help right away. Call your local emergency services (911 in the [place] Do not drive yourself to the hospital.  Summary  After this procedure, it is common to have fluid, blood, or pus coming from the surgery site.  Follow all home care instructions. You will be told how to take care of your incision, how to check for infection, and how to take medicines.  If you were prescribed an antibiotic medicine, take it as told by your health care provider. Do not stop taking the antibiotic even if you start to feel better.  Contact a health care provider if you have increased redness, swelling, or pain around your incision. Get help right away if you have chest pain, you vomit, you cough up blood, or you have shortness of breath.  Keep all follow-up visits as told by your health care provider. This is important.    DIET: To avoid nausea, slowly advance diet as tolerated, avoiding spicy or greasy foods for the first day.  Add more substantial food to your diet according to your physician's instructions.  Babies can be fed formula or breast milk as soon as they are hungry.  INCREASE FLUIDS AND FIBER TO AVOID CONSTIPATION.    SURGICAL DRESSING/BATHING: Keep dressing on for 48 hours, do not submerge in water until cleared by provider.     MEDICATIONS: Resume taking daily medication.  Take prescribed pain medication with food.  If no medication is prescribed, you may take non-aspirin pain medication if needed.  PAIN MEDICATION CAN BE VERY CONSTIPATING.  Take a stool softener or laxative such as senokot, pericolace, or milk of magnesia if  needed.    Prescription given for Percocet to [facility].    A follow-up appointment should be arranged with your doctor in 1-2 weeks; call to schedule.    You should CALL YOUR PHYSICIAN if you develop:  Fever greater than 101 degrees F.  Pain not relieved by medication, or persistent nausea or vomiting.  Excessive bleeding (blood soaking through dressing) or unexpected drainage from the wound.  Extreme redness or swelling around the incision site, drainage of pus or foul smelling drainage.  Inability to urinate or empty your bladder within 8 hours.  Problems with breathing or chest pain.    You should call 911 if you develop problems with breathing or chest pain.  If you are unable to contact your doctor or surgical center, you should go to the nearest emergency room or urgent care center.  Physician's telephone #: [phone]     MILD FLU-LIKE SYMPTOMS ARE NORMAL.  YOU MAY EXPERIENCE GENERALIZED MUSCLE ACHES, THROAT IRRITATION, HEADACHE AND/OR SOME NAUSEA.    If any questions arise, call your doctor.  If your doctor is not available, please feel free to call the Surgical Center at [phone].  The Center is open Monday through Friday from 7AM to 7PM.      A registered nurse may call you a few days after your surgery to see how you are doing after your procedure.    You may also receive a survey in the mail within the next two weeks and we ask that you take a few moments to complete the survey and return it to us.  Our goal is to provide you with very good care and we value your comments.     Depression / Suicide Risk    As you are discharged from this [facility] facility, it is important to learn how to keep safe from harming yourself.    Recognize the warning signs:  Abrupt changes in personality, positive or negative- including increase in energy   Giving away possessions  Change in eating patterns- significant weight changes-  positive or negative  Change in sleeping patterns- unable to sleep or sleeping  all the time   Unwillingness or inability to communicate  Depression  Unusual sadness, discouragement and loneliness  Talk of wanting to die  Neglect of personal appearance   Rebelliousness- reckless behavior  Withdrawal from people/activities they love  Confusion- inability to concentrate     If you or a loved one observes any of these behaviors or has concerns about self-harm, here's what you can do:  Talk about it- your feelings and reasons for harming yourself  Remove any means that you might use to hurt yourself (examples: pills, rope, extension cords, firearm)  Get professional help from the community (Mental Health, Substance Abuse, psychological counseling)  Do not be alone:Call your Safe Contact- someone whom you trust who will be there for you.  Call your local CRISIS HOTLINE 418-3108 or 114-415-2057  Call your local Children's Mobile Crisis Response Team [place] (476) 484-2097 or www.Path.To  Call the toll free National Suicide Prevention Hotlines   National Suicide Prevention Lifeline 449-179-DHDL (7908)  [place] [facility] 800-SUICIDE (868-6400)    I acknowledge receipt and understanding of these Home Care instructions.

## 2024-06-04 NOTE — ANESTHESIA PREPROCEDURE EVALUATION
Case: [id number] Date/Time: 06/04/24 1345    Procedure: EXCISION OF LIPOMA ON BACK    Pre-op diagnosis: LIPOMA OF BACK    Location: [facility] OR 11 / SURGERY [facility]    Surgeons: Chris Estes M.D.            Relevant Problems   No relevant active problems       Physical Exam    Airway   Mallampati: II  TM distance: >3 FB  Neck ROM: full       Cardiovascular - normal exam  Rhythm: regular  Rate: normal  (-) murmur     Dental - normal exam           Pulmonary - normal exam  Breath sounds clear to auscultation     Abdominal    Neurological - normal exam                   Anesthesia Plan    ASA 1       Plan - general       Airway plan will be ETT          Induction: intravenous    Postoperative Plan: Postoperative administration of opioids is intended.    Pertinent diagnostic labs and testing reviewed    Informed Consent:    Anesthetic plan and risks discussed with patient.    Use of blood products discussed with: patient whom consented to blood products.

## 2024-06-04 NOTE — ANESTHESIA POSTPROCEDURE EVALUATION
Patient: Helen Russo    Procedure Summary       Date: 06/04/24 Room / Location: [facility] 11 / SURGERY [facility]    Anesthesia Start: 1525 Anesthesia Stop: 1613    Procedure: EXCISION OF LIPOMA ON BACK (Back) Diagnosis: (LIPOMA OF BACK)    Surgeons: Chris Estes M.D. Responsible Provider: Anoop Colón M.D.    Anesthesia Type: general ASA Status: 1            Final Anesthesia Type: general  Last vitals  BP   Blood Pressure: 138/75    Temp   36.1 °C (97 °F)    Pulse   79   Resp   20    SpO2   100 %      Anesthesia Post Evaluation    Patient location during evaluation: PACU  Patient participation: complete - patient participated  Level of consciousness: awake and alert    Airway patency: patent  Anesthetic complications: no  Cardiovascular status: hemodynamically stable  Respiratory status: face mask    PONV: none          No notable events documented.     Nurse Pain Score: 1 (NPRS)

## 2024-06-04 NOTE — OR NURSING
Patient AAOx4, calm, denies pain post op. Right lower back surgical dressing CDI, surrounding tissue soft and pink. VSS, afebrile, room air 97% breathing even and unlabored. Tolerating water, no nausea. Patients spouse updated on status and plan of care.

## 2024-06-05 LAB — PATHOLOGY CONSULT NOTE: NORMAL

## 2024-06-05 NOTE — OP REPORT
DATE OF SERVICE:  06/04/2024     PREOPERATIVE DIAGNOSIS:  Right lower back lipoma.     POSTOPERATIVE DIAGNOSIS:  Right lower back lipoma.     PROCEDURE:  Excision of 3x3 cm subcutaneous lipoma.     SURGEON:  Chris Estes MD     ASSISTANT:  None.     ANESTHESIA:  General endotracheal anesthesia.     ESTIMATED BLOOD LOSS:  Less than 5 mL.     SPECIMENS:  Lipoma.     COMPLICATIONS:  None.     CONDITION:  Stable.     INDICATIONS FOR PROCEDURE:  This is a 33-year-old female who presents with a   mobile lipoma in the subcutaneous tissues of the right lower back.  This is   deepened to the skin, but not fixed to surrounding structures.  Risks,   benefits and alternatives of excision were explained to her before proceeding   as this was growing and causing pain.     OPERATIVE FINDINGS:  A 3x3 cm mobile lipoma removed from the subcutaneous of   the right lower back, 5 cm wound closure with hemostasis.     OPERATIVE TECHNIQUE:  After informed consent was obtained, the patient was   taken to the operating room and placed in supine position.  After adequate   general endotracheal anesthesia was achieved, she was switched to the prone   position and the right lower back was prepped and draped in sterile fashion.    Operation was begun by making a 5 cm transverse incision, which was carried   down with cautery through the subcutaneous tissues.  We identified the   lipomatous tissue.  We carefully dissected this from the surrounding   attachments and then amputated it at the base, freeing it from surrounding   adhesions.  We irrigated the wound and after applying cautery, noted   hemostasis.  We then closed the wound in layers with interrupted 3-0 Vicryl   sutures and a running 4-0 Monocryl.  The 2x2s and Tegaderm dressings were   placed and the procedure was concluded.  The patient was returned to the PACU   in stable condition.  All instrument counts were correct at the end of the    procedure.        ______________________________  MD DOROTHY Shearer    DD:  06/04/2024 16:22  DT:  06/04/2024 17:04    Job#:  [id number]

## 2024-06-05 NOTE — OR NURSING
Arrived from PACU AXO, Pt's VSS; denies N/V; states pain is at tolerable level. Dressing CDI to lower back.     D/c orders received. IV dc'd. Pt changed into clothing with assistance. Discharge reviewed, Pt and family verbalized understanding and questions answered. Patient states ready to d/c home. Pt dc'd in w/c with step dad.

## 2024-12-20 ENCOUNTER — HOSPITAL ENCOUNTER (OUTPATIENT)
Facility: MEDICAL CENTER | Age: 34
End: 2024-12-20
Attending: OBSTETRICS & GYNECOLOGY
Payer: OTHER GOVERNMENT

## 2024-12-20 PROCEDURE — 88142 CYTOPATH C/V THIN LAYER: CPT

## 2024-12-24 LAB — THINPREP PAP, CYTOLOGY NL11781: NORMAL

## 2025-06-23 ENCOUNTER — APPOINTMENT (OUTPATIENT)
Dept: RADIOLOGY | Facility: MEDICAL CENTER | Age: 35
End: 2025-06-23
Attending: EMERGENCY MEDICINE
Payer: OTHER GOVERNMENT

## 2025-06-23 ENCOUNTER — HOSPITAL ENCOUNTER (EMERGENCY)
Facility: MEDICAL CENTER | Age: 35
End: 2025-06-23
Attending: EMERGENCY MEDICINE
Payer: OTHER GOVERNMENT

## 2025-06-23 VITALS
OXYGEN SATURATION: 97 % | TEMPERATURE: 97.8 F | WEIGHT: 160.94 LBS | HEART RATE: 81 BPM | RESPIRATION RATE: 14 BRPM | BODY MASS INDEX: 23.84 KG/M2 | HEIGHT: 69 IN | DIASTOLIC BLOOD PRESSURE: 78 MMHG | SYSTOLIC BLOOD PRESSURE: 122 MMHG

## 2025-06-23 DIAGNOSIS — R55 SYNCOPE, UNSPECIFIED SYNCOPE TYPE: Primary | ICD-10-CM

## 2025-06-23 DIAGNOSIS — S09.90XA CLOSED HEAD INJURY, INITIAL ENCOUNTER: ICD-10-CM

## 2025-06-23 LAB
ALBUMIN SERPL BCP-MCNC: 4.7 G/DL (ref 3.2–4.9)
ALBUMIN/GLOB SERPL: 1.4 G/DL
ALP SERPL-CCNC: 52 U/L (ref 30–99)
ALT SERPL-CCNC: 18 U/L (ref 2–50)
ANION GAP SERPL CALC-SCNC: 13 MMOL/L (ref 7–16)
AST SERPL-CCNC: 22 U/L (ref 12–45)
BASOPHILS # BLD AUTO: 0.6 % (ref 0–1.8)
BASOPHILS # BLD: 0.05 K/UL (ref 0–0.12)
BILIRUB SERPL-MCNC: 0.8 MG/DL (ref 0.1–1.5)
BUN SERPL-MCNC: 11 MG/DL (ref 8–22)
CALCIUM ALBUM COR SERPL-MCNC: 9.5 MG/DL (ref 8.5–10.5)
CALCIUM SERPL-MCNC: 10.1 MG/DL (ref 8.5–10.5)
CHLORIDE SERPL-SCNC: 103 MMOL/L (ref 96–112)
CO2 SERPL-SCNC: 24 MMOL/L (ref 20–33)
CREAT SERPL-MCNC: 0.76 MG/DL (ref 0.5–1.4)
D DIMER PPP IA.FEU-MCNC: <0.27 UG/ML (FEU) (ref 0–0.5)
EKG IMPRESSION: NORMAL
EOSINOPHIL # BLD AUTO: 0.15 K/UL (ref 0–0.51)
EOSINOPHIL NFR BLD: 1.7 % (ref 0–6.9)
ERYTHROCYTE [DISTWIDTH] IN BLOOD BY AUTOMATED COUNT: 41 FL (ref 35.9–50)
GFR SERPLBLD CREATININE-BSD FMLA CKD-EPI: 104 ML/MIN/1.73 M 2
GLOBULIN SER CALC-MCNC: 3.4 G/DL (ref 1.9–3.5)
GLUCOSE SERPL-MCNC: 110 MG/DL (ref 65–99)
HCT VFR BLD AUTO: 47.3 % (ref 37–47)
HGB BLD-MCNC: 16.1 G/DL (ref 12–16)
IMM GRANULOCYTES # BLD AUTO: 0.03 K/UL (ref 0–0.11)
IMM GRANULOCYTES NFR BLD AUTO: 0.3 % (ref 0–0.9)
LYMPHOCYTES # BLD AUTO: 2.06 K/UL (ref 1–4.8)
LYMPHOCYTES NFR BLD: 23.7 % (ref 22–41)
MCH RBC QN AUTO: 31.1 PG (ref 27–33)
MCHC RBC AUTO-ENTMCNC: 34 G/DL (ref 32.2–35.5)
MCV RBC AUTO: 91.5 FL (ref 81.4–97.8)
MONOCYTES # BLD AUTO: 0.55 K/UL (ref 0–0.85)
MONOCYTES NFR BLD AUTO: 6.3 % (ref 0–13.4)
NEUTROPHILS # BLD AUTO: 5.85 K/UL (ref 1.82–7.42)
NEUTROPHILS NFR BLD: 67.4 % (ref 44–72)
NRBC # BLD AUTO: 0 K/UL
NRBC BLD-RTO: 0 /100 WBC (ref 0–0.2)
PLATELET # BLD AUTO: 278 K/UL (ref 164–446)
PMV BLD AUTO: 10.4 FL (ref 9–12.9)
POTASSIUM SERPL-SCNC: 3.7 MMOL/L (ref 3.6–5.5)
PROT SERPL-MCNC: 8.1 G/DL (ref 6–8.2)
RBC # BLD AUTO: 5.17 M/UL (ref 4.2–5.4)
SODIUM SERPL-SCNC: 140 MMOL/L (ref 135–145)
TROPONIN T SERPL-MCNC: <6 NG/L (ref 6–19)
TSH SERPL-ACNC: 0.58 UIU/ML (ref 0.38–5.33)
WBC # BLD AUTO: 8.7 K/UL (ref 4.8–10.8)

## 2025-06-23 PROCEDURE — 93005 ELECTROCARDIOGRAM TRACING: CPT | Mod: TC | Performed by: EMERGENCY MEDICINE

## 2025-06-23 PROCEDURE — 93005 ELECTROCARDIOGRAM TRACING: CPT | Mod: TC

## 2025-06-23 PROCEDURE — 36415 COLL VENOUS BLD VENIPUNCTURE: CPT

## 2025-06-23 PROCEDURE — 70450 CT HEAD/BRAIN W/O DYE: CPT

## 2025-06-23 PROCEDURE — 84443 ASSAY THYROID STIM HORMONE: CPT

## 2025-06-23 PROCEDURE — 85379 FIBRIN DEGRADATION QUANT: CPT

## 2025-06-23 PROCEDURE — 70486 CT MAXILLOFACIAL W/O DYE: CPT

## 2025-06-23 PROCEDURE — 99284 EMERGENCY DEPT VISIT MOD MDM: CPT

## 2025-06-23 PROCEDURE — 80053 COMPREHEN METABOLIC PANEL: CPT

## 2025-06-23 PROCEDURE — 84484 ASSAY OF TROPONIN QUANT: CPT

## 2025-06-23 PROCEDURE — 71045 X-RAY EXAM CHEST 1 VIEW: CPT

## 2025-06-23 PROCEDURE — 85025 COMPLETE CBC W/AUTO DIFF WBC: CPT

## 2025-06-23 ASSESSMENT — PAIN DESCRIPTION - PAIN TYPE: TYPE: ACUTE PAIN

## 2025-06-23 ASSESSMENT — FIBROSIS 4 INDEX: FIB4 SCORE: 0.52

## 2025-06-23 NOTE — ED NOTES
PT ambulated to [facility] with a steady gate. C/C is GLF and substance coming out of nose. Pt is in a gown, on the monitor, and call light is within reach. Chart up for ERP.

## 2025-06-23 NOTE — ED NOTES
Patient discharge per order. Oral and written discharge instructions reviewed. IV removed.  All belongings accounted for and taken with patient. Questions answered, and patient agrees with discharge plan. Encouraged to follow up with PCP.

## 2025-06-23 NOTE — ED NOTES
Bedside report received from off going RN/tech: Addis KRAUSE, assumed care of patient.  POC discussed with patient. Call light within reach, all needs addressed at this time.       Fall risk interventions in place: Patient's personal possessions are with in their safe reach, Place socks on patient, Place fall risk sign on patient's door, Keep floor surfaces clean and dry, and Accompanied to restroom (all applicable per Kelly Fall risk assessment)   Continuous monitoring: Cardiac Leads, Pulse Ox, or Blood Pressure  IVF/IV medications: Not Applicable   Oxygen: Room Air  Bedside sitter: Not Applicable   Isolation: Not Applicable

## 2025-06-23 NOTE — DISCHARGE INSTRUCTIONS
Follow-up with primary care this week for reevaluation, medication management and further workup if symptoms persist or recur.    Encourage oral fluid hydration.  Diet and activity as tolerated.    Return to the emergency department for recurrent syncope or for worsening headache, altered mental status, visual changes, slurred speech, focal weakness, nasal discharge, chest pain, shortness of breath, palpitations, vomiting or other new concerns.

## 2025-06-23 NOTE — ED PROVIDER NOTES
ED Provider Note    CHIEF COMPLAINT  Chief Complaint   Patient presents with    Head Injury    GLF       EXTERNAL RECORDS REVIEWED  Other noncontributory    HPI/ROS  LIMITATION TO HISTORY   Select: : None  OUTSIDE HISTORIAN(S):  none    Helen Russo is a 34 y.o. female who presents to the emergency department through triage after head injury.  Patient states she woke from sleep to let the dogs outside while she was walking down the hallway at home she felt lightheaded and passed out.  She struck the back of her head on a baby gate.   awoke to the thud and helped her to her feet, she went back to sleep.  When she awoke this morning she had a mild posterior headache and some lightheadedness.  No neck pain or back pain.  She went to work but when she bent over to do a task she had a gush of clear fluid from her nose.  This happened a couple of more times, she is concerned for a CSF leak.    Denies chest pain, palpitations or shortness of breath.  She does have history of syncope prior.  Denies abdominal pain or back pain.  Denies cough or congestion.  Denies fever or chills.  Denies vomiting or diarrhea.  She did travel to [place] couple of weeks ago and does have some recurrent history of dizziness and increased ear pressure after similar travel.    PAST MEDICAL HISTORY   has a past medical history of Psychiatric problem (05/2024).    SURGICAL HISTORY   has a past surgical history that includes lipoma excision (6/4/2024).    FAMILY HISTORY  Family History   Problem Relation Age of Onset    Cancer Mother         Breast/Thyroid    No Known Problems Father        SOCIAL HISTORY  Social History     Tobacco Use    Smoking status: Never    Smokeless tobacco: Never   Vaping Use    Vaping status: Never Used   Substance and Sexual Activity    Alcohol use: Yes     Comment: 1 drink a night    Drug use: Not Currently     Types: Marijuana    Sexual activity: Not Currently     Partners: Male     Birth  "control/protection: Pill       CURRENT MEDICATIONS  Home Medications       Reviewed by Dominique Miller R.N. (Registered Nurse) on 06/23/25 at 1044  Med List Status: Not Addressed     Medication Last Dose Status   B Complex Vitamins (VITAMIN B COMPLEX) Tab  Active   loratadine (CLARITIN) 10 MG Tab  Active   Multiple Vitamins Tab  Active   NEXTSTELLIS 3-14.2 MG Tab  Active   VITAMIN D, CHOLECALCIFEROL, PO  Active                    ALLERGIES  Allergies[1]    PHYSICAL EXAM  VITAL SIGNS: /78   Pulse 81   Temp 36.6 °C (97.8 °F) (Temporal)   Resp 14   Ht 1.753 m (5' 9\")   Wt 73 kg (160 lb 15 oz)   SpO2 97%   BMI 23.77 kg/m²    Pulse ox interpretation: I interpret this pulse ox as normal.  Constitutional: Alert in no apparent distress.  HENT: Normocephalic, small posterior/occipital cephalohematoma. Bilateral external ears normal, Nose normal. Moist mucous membranes.    Eyes: Pupils are equal and reactive, Conjunctiva normal.  EOMI, no nystagmus  Neck: No midline tenderness palpation.  Full range of motion without pain or resistance.  Supple.  Lymphatic: No lymphadenopathy noted.   Cardiovascular: Regular rate and rhythm, no murmurs. Distal pulses intact.  No peripheral edema.  Thorax & Lungs: Normal breath sounds.  No wheezing/rales/ronchi. No increased work of breathing, clipped speech or retractions.   Abdomen: Soft, non-distended, non-tender to palpation. No palpable or pulsatile masses. No peritoneal signs.   Skin: Warm, Dry, No erythema, No rash.   Musculoskeletal: Good range of motion in all major joints. No major deformities noted.   Neurologic: Alert  and orient x 4.  Speech is clear and cohesive.  Moves 4 extremity spontaneously.  No facial droop.  5 out of 5 strength lower extremities with proximal and distal muscle groups.  No pronator drift.  Straight leg raise intact bilaterally.  Psychiatric: Affect normal, Judgment normal, Mood normal.       EKG/LABS  Results for orders placed or performed during " the hospital encounter of 25   EKG    Collection Time: 25 12:19 PM   Result Value Ref Range    Report       [facility] Emergency Dept.    Test Date:  2025  Pt Name:    NICO RIOS                Department: ER  MRN:        [id number]                      Room:       [facility]  Gender:     Female                       Technician: [id number]  :        1990                   Requested By:ER TRIAGE PROTOCOL  Order #:    [id number]                    Reading MD: KHURRAM AZEVEDO DO    Measurements  Intervals                                Axis  Rate:       61                           P:          65  WY:         145                          QRS:        76  QRSD:       90                           T:          42  QT:         410  QTc:        413    Interpretive Statements  Sinus rhythm  No previous ECG available for comparison  Electronically Signed On 2025 12:19:17 PDT by KHURRAM AZEVEDO DO     CBC w/ Differential    Collection Time: 25 12:33 PM   Result Value Ref Range    WBC 8.7 4.8 - 10.8 K/uL    RBC 5.17 4.20 - 5.40 M/uL    Hemoglobin 16.1 (H) 12.0 - 16.0 g/dL    Hematocrit 47.3 (H) 37.0 - 47.0 %    MCV 91.5 81.4 - 97.8 fL    MCH 31.1 27.0 - 33.0 pg    MCHC 34.0 32.2 - 35.5 g/dL    RDW 41.0 35.9 - 50.0 fL    Platelet Count 278 164 - 446 K/uL    MPV 10.4 9.0 - 12.9 fL    Neutrophils-Polys 67.40 44.00 - 72.00 %    Lymphocytes 23.70 22.00 - 41.00 %    Monocytes 6.30 0.00 - 13.40 %    Eosinophils 1.70 0.00 - 6.90 %    Basophils 0.60 0.00 - 1.80 %    Immature Granulocytes 0.30 0.00 - 0.90 %    Nucleated RBC 0.00 0.00 - 0.20 /100 WBC    Neutrophils (Absolute) 5.85 1.82 - 7.42 K/uL    Lymphs (Absolute) 2.06 1.00 - 4.80 K/uL    Monos (Absolute) 0.55 0.00 - 0.85 K/uL    Eos (Absolute) 0.15 0.00 - 0.51 K/uL    Baso (Absolute) 0.05 0.00 - 0.12 K/uL    Immature Granulocytes (abs) 0.03 0.00 - 0.11 K/uL    NRBC (Absolute) 0.00 K/uL   Complete Metabolic Panel (CMP)    Collection  Time: 06/23/25 12:33 PM   Result Value Ref Range    Sodium 140 135 - 145 mmol/L    Potassium 3.7 3.6 - 5.5 mmol/L    Chloride 103 96 - 112 mmol/L    Co2 24 20 - 33 mmol/L    Anion Gap 13.0 7.0 - 16.0    Glucose 110 (H) 65 - 99 mg/dL    Bun 11 8 - 22 mg/dL    Creatinine 0.76 0.50 - 1.40 mg/dL    Calcium 10.1 8.5 - 10.5 mg/dL    Correct Calcium 9.5 8.5 - 10.5 mg/dL    AST(SGOT) 22 12 - 45 U/L    ALT(SGPT) 18 2 - 50 U/L    Alkaline Phosphatase 52 30 - 99 U/L    Total Bilirubin 0.8 0.1 - 1.5 mg/dL    Albumin 4.7 3.2 - 4.9 g/dL    Total Protein 8.1 6.0 - 8.2 g/dL    Globulin 3.4 1.9 - 3.5 g/dL    A-G Ratio 1.4 g/dL   Troponin - STAT Once    Collection Time: 06/23/25 12:33 PM   Result Value Ref Range    Troponin T <6 6 - 19 ng/L   TSH (for screening thyroid dysfunction)    Collection Time: 06/23/25 12:33 PM   Result Value Ref Range    TSH 0.581 0.380 - 5.330 uIU/mL   D-Dimer (only helpful in low pre-test probability wells critieria. Do not order if patient ruled out by PERC criteria. See Weblinks at top of Labs section)    Collection Time: 06/23/25 12:33 PM   Result Value Ref Range    D-Dimer <0.27 0.00 - 0.50 ug/mL (FEU)   ESTIMATED GFR    Collection Time: 06/23/25 12:33 PM   Result Value Ref Range    GFR (CKD-EPI) 104 >60 mL/min/1.73 m 2     I have independently interpreted this EKG    RADIOLOGY/PROCEDURES   I have independently interpreted the diagnostic imaging associated with this visit and am waiting the final reading from the radiologist.   My preliminary interpretation is as follows:   Chest x-ray: Normal lung fields, no consolidation or effusion    Radiologist interpretation:  CT-MAXILLOFACIAL W/O PLUS RECONS   Final Result      1.  No acute facial bone fracture.   2.  Mild chronic right maxillary sinus disease.   3.  Multilevel left-sided cervical facet arthrosis.      CT-HEAD W/O   Final Result      No acute intracranial abnormality.                                 DX-CHEST-PORTABLE (1 VIEW)   Final Result     "  No acute cardiac or pulmonary abnormalities are identified.          COURSE & MEDICAL DECISION MAKING    ASSESSMENT, COURSE AND PLAN  Care Narrative:   12:20 PM seen evaluated at bedside.  History of syncopal episode, head strike.  Was feeling well enough to go to work but then had gush of clear fluid a couple of times from her nose and is concerned for CSF leak.  Small occipital cephalohematoma.  No neck or back pain.  No other physical clinical evidence for trauma.  Neurologically intact and nonfocal.  Hemodynamically stable.  Denies chest pain, shortness of breath at any time.  She does have history of intermittent palpitations but none today.  Add labs, CT head, max face.  Obtain sample of nasal liquid if this recurs.    No leukocytosis, left shift or bandemia.  No anemia.  No electrolyte derangement.  Normal troponin, D-dimer and TSH.    Chest x-ray within normal limits.    CT head maxillofacial also within normal limits.  Mild chronic right maxillary sinus disease.  Multilevel left-sided cervical facet arthrosis.  Neither associated with this acute trauma.  Patient admits to history of \"mucous retention cyst\" for years.    Patient has not been able to reproduce the gushes of clear fluid from her nose while here in the emergency department \"maybe just a little bit on a Kleenex.\"  Unavailable to send liquid for sample.  Doubt CSF leak but patient is strongly encouraged to seek new evaluation or ENT consult if discharge recurs or persist.      ADDITIONAL PROBLEMS MANAGED    DISPOSITION AND DISCUSSIONS  ED evaluation for syncopal episode is unrevealing.  EKG within normal limits.  No evidence for WPW, long QT, LVH or Brugada.  Continuous telemetry without ectopy or arrhythmia.  Labs are unremarkable.  PERC negative, D-dimer negative, no clinical sequela for DVT, doubt PE.  Patient describes some vague history of intermittent palpitations and syncope prior as well.  Closed head injury after a fall with a syncopal " episode, no CT evidence for intracranial hemorrhage, skull fracture, facial fracture or sinus fracture.  Clear fluid discharge from her nose has nearly resolved, unable to send sample for CSF, this diagnosis seems less likely after unremarkable evaluation and resolution of discharge.  She is hemodynamically stable throughout, neurologically intact and nonfocal.  Stable for discharge trial and outpatient follow-up    Discussion of management with other [facility] or appropriate source(s): None     Escalation of care considered, and ultimately not performed:acute inpatient care management, however at this time, the patient is most appropriate for outpatient management    Barriers to care at this time, including but not limited to: None.     Decision tools and prescription drugs considered including, but not limited to: None.    The patient is stable for discharge home, anticipatory guidance provided, close follow-up is encouraged and strict return instructions have been discussed. Patient is agreeable to the disposition and plan.      FINAL DIAGNOSIS  1. Syncope, unspecified syncope type    2. Closed head injury, initial encounter         Electronically signed by: Lorraine Villalba D.O., 6/23/2025 12:20 PM           [1]   Allergies  Allergen Reactions    Cyanoacrylate [Mecrylate]      Swelling, wound fails to heal

## 2025-08-01 ENCOUNTER — HOSPITAL ENCOUNTER (OUTPATIENT)
Dept: LAB | Facility: MEDICAL CENTER | Age: 35
End: 2025-08-01
Attending: PHYSICIAN ASSISTANT
Payer: OTHER GOVERNMENT

## 2025-08-01 LAB
CORTIS SERPL-MCNC: 15 UG/DL (ref 0–23)
DHEA-S SERPL-MCNC: 297 UG/DL (ref 60.9–337)
T4 FREE SERPL-MCNC: 1.48 NG/DL (ref 0.93–1.7)
THYROPEROXIDASE AB SERPL-ACNC: <9 IU/ML (ref 0–9)
TSH SERPL-ACNC: 0.48 UIU/ML (ref 0.38–5.33)

## 2025-08-01 PROCEDURE — 36415 COLL VENOUS BLD VENIPUNCTURE: CPT

## 2025-08-01 PROCEDURE — 84439 ASSAY OF FREE THYROXINE: CPT

## 2025-08-01 PROCEDURE — 84403 ASSAY OF TOTAL TESTOSTERONE: CPT

## 2025-08-01 PROCEDURE — 82627 DEHYDROEPIANDROSTERONE: CPT

## 2025-08-01 PROCEDURE — 82533 TOTAL CORTISOL: CPT

## 2025-08-01 PROCEDURE — 86376 MICROSOMAL ANTIBODY EACH: CPT

## 2025-08-01 PROCEDURE — 83498 ASY HYDROXYPROGESTERONE 17-D: CPT

## 2025-08-01 PROCEDURE — 84443 ASSAY THYROID STIM HORMONE: CPT

## 2025-08-08 LAB — 17OHP SERPL-MCNC: 29.01 NG/DL

## 2025-08-09 LAB — TESTOST SERPL-MCNC: 37 NG/DL (ref 9–55)

## (undated) DEVICE — CHLORAPREP 26 ML APPLICATOR - ORANGE TINT(25/CA)

## (undated) DEVICE — GLOVE SZ 6.5 BIOGEL PI MICRO - PF LF (50PR/BX)

## (undated) DEVICE — DRESSING TRANSPARENT FILM TEGADERM 4 X 4.75" (50EA/BX)"

## (undated) DEVICE — PACK MINOR BASIN - (2EA/CA)

## (undated) DEVICE — ELECTRODE DUAL RETURN W/ CORD - (50/PK)

## (undated) DEVICE — SPONGE GAUZESTER 4 X 4 4PLY - (128PK/CA)

## (undated) DEVICE — TUBING CLEARLINK DUO-VENT - C-FLO (48EA/CA)

## (undated) DEVICE — COVER LIGHT HANDLE ALC PLUS DISP (18EA/BX)

## (undated) DEVICE — SUTURE 4-0 MONOCRYL PLUS PS-1 - 27 INCH (36/BX)

## (undated) DEVICE — SENSOR OXIMETER ADULT SPO2 RD SET (20EA/BX)

## (undated) DEVICE — SUTURE 3-0 VICRYL PLUS SH - 8X 18 INCH (12/BX)

## (undated) DEVICE — CANISTER SUCTION 3000ML MECHANICAL FILTER AUTO SHUTOFF MEDI-VAC NONSTERILE LF DISP  (40EA/CA)

## (undated) DEVICE — SUTURE GENERAL

## (undated) DEVICE — SET EXTENSION WITH 2 PORTS (48EA/CA) ***PART #2C8610 IS A SUBSTITUTE*****

## (undated) DEVICE — GOWN SURGEONS X-LARGE - DISP. (30/CA)

## (undated) DEVICE — SET LEADWIRE 5 LEAD BEDSIDE DISPOSABLE ECG (1SET OF 5/EA)

## (undated) DEVICE — GLOVE BIOGEL SZ 7 SURGICAL PF LTX - (50PR/BX 4BX/CA)

## (undated) DEVICE — GLOVE BIOGEL PI INDICATOR SZ 7.0 SURGICAL PF LF - (50/BX 4BX/CA)

## (undated) DEVICE — SPONGE GAUZESTER. 2X2 4-PL - (2/PK 50PK/BX 30BX/CS)

## (undated) DEVICE — GOWN WARMING STANDARD FLEX - (30/CA)

## (undated) DEVICE — SUCTION INSTRUMENT YANKAUER BULBOUS TIP W/O VENT (50EA/CA)

## (undated) DEVICE — DRAPE SURGICAL U 77X120 - (10/CA)

## (undated) DEVICE — SLEEVE, VASO, THIGH, MED

## (undated) DEVICE — SODIUM CHL IRRIGATION 0.9% 1000ML (12EA/CA)

## (undated) DEVICE — GLOVE BIOGEL INDICATOR SZ 7SURGICAL PF LTX - (50/BX 4BX/CA)